# Patient Record
Sex: FEMALE | Race: WHITE | Employment: UNEMPLOYED | URBAN - METROPOLITAN AREA
[De-identification: names, ages, dates, MRNs, and addresses within clinical notes are randomized per-mention and may not be internally consistent; named-entity substitution may affect disease eponyms.]

---

## 2017-04-06 ENCOUNTER — OFFICE VISIT (OUTPATIENT)
Dept: FAMILY MEDICINE CLINIC | Facility: CLINIC | Age: 27
End: 2017-04-06

## 2017-04-06 VITALS
RESPIRATION RATE: 12 BRPM | TEMPERATURE: 99 F | DIASTOLIC BLOOD PRESSURE: 73 MMHG | HEIGHT: 65 IN | BODY MASS INDEX: 20.53 KG/M2 | HEART RATE: 71 BPM | SYSTOLIC BLOOD PRESSURE: 120 MMHG | WEIGHT: 123.19 LBS

## 2017-04-06 DIAGNOSIS — M25.661 DECREASED RANGE OF MOTION OF RIGHT KNEE: ICD-10-CM

## 2017-04-06 DIAGNOSIS — G89.29 CHRONIC PAIN OF RIGHT KNEE: Primary | ICD-10-CM

## 2017-04-06 DIAGNOSIS — R29.898 WEAKNESS OF RIGHT LEG: ICD-10-CM

## 2017-04-06 DIAGNOSIS — M25.561 CHRONIC PAIN OF RIGHT KNEE: Primary | ICD-10-CM

## 2017-04-06 PROCEDURE — 99214 OFFICE O/P EST MOD 30 MIN: CPT | Performed by: FAMILY MEDICINE

## 2017-04-06 PROCEDURE — 99212 OFFICE O/P EST SF 10 MIN: CPT | Performed by: FAMILY MEDICINE

## 2017-04-06 RX ORDER — METHYLPREDNISOLONE 4 MG/1
TABLET ORAL
Qty: 1 KIT | Refills: 0 | Status: SHIPPED | OUTPATIENT
Start: 2017-04-06 | End: 2017-05-11 | Stop reason: ALTCHOICE

## 2017-04-06 NOTE — PROGRESS NOTES
Patient ID: Jerardo Downey is a 27year old female. HPI  Patient presents with:  Pain: right knee    She saw me in December. She states that her knee is really not changed.   She still has pain on the medial aspect of the right knee near the patella bu Strength       Quadriceps 5/5      Hamstrings 5/5       Joint line tenderness       Medial None      Lateral None      Pes anserinus None   Medial aspect of patella  +++       Guilherme's Medial Negative   Guilherme's Lateral Negative        Stability Test

## 2017-04-11 ENCOUNTER — TELEPHONE (OUTPATIENT)
Dept: FAMILY MEDICINE CLINIC | Facility: CLINIC | Age: 27
End: 2017-04-11

## 2017-04-11 DIAGNOSIS — M25.669 DECREASED RANGE OF MOTION (ROM) OF KNEE: ICD-10-CM

## 2017-04-11 DIAGNOSIS — G89.29 CHRONIC PAIN OF RIGHT KNEE: Primary | ICD-10-CM

## 2017-04-11 DIAGNOSIS — M25.561 CHRONIC PAIN OF RIGHT KNEE: Primary | ICD-10-CM

## 2017-04-11 NOTE — TELEPHONE ENCOUNTER
After faxing over notes , The patients insurance is requesting a peer to peer for authorization.     Please call [phone]    Tracking number : [id number]    Test : MRI of R knee    Thank You

## 2017-04-12 NOTE — TELEPHONE ENCOUNTER
Let patient know her insurance refuses to cover the MRI of the knee which I find ridiculous considering that her range of motion and strength is so decreased. I will have her see an orthopedic doctor for evaluation.

## 2017-04-12 NOTE — TELEPHONE ENCOUNTER
Called patient and informed of the denial and referral to see ortho. Information provided to patient. She states she will call and make an appointment.

## 2017-04-12 NOTE — TELEPHONE ENCOUNTER
[facility] THROUGH [facility] HAS DENIED THIS AUTHORIZATION  REQUEST . A PEER TO PEER CAN STILL BE DONE TO APPEAL THIS DECISION.     Please call [phone]   Tracking number : [id number]   Test : MRI of R knee   Thank You

## 2017-04-17 ENCOUNTER — TELEPHONE (OUTPATIENT)
Dept: GASTROENTEROLOGY | Facility: CLINIC | Age: 27
End: 2017-04-17

## 2017-04-17 DIAGNOSIS — B18.1 HEPATITIS B CARRIER (HCC): Primary | ICD-10-CM

## 2017-04-17 NOTE — TELEPHONE ENCOUNTER
Recall liver US one year     Per 5/2016 encounter - due for labs, US liver and office visit.     Please sign off on above and I will notify pt

## 2017-05-02 ENCOUNTER — OFFICE VISIT (OUTPATIENT)
Dept: ORTHOPEDICS CLINIC | Facility: CLINIC | Age: 27
End: 2017-05-02

## 2017-05-02 DIAGNOSIS — M22.41 PATELLA, CHONDROMALACIA, RIGHT: Primary | ICD-10-CM

## 2017-05-02 PROCEDURE — 99212 OFFICE O/P EST SF 10 MIN: CPT | Performed by: ORTHOPAEDIC SURGERY

## 2017-05-02 PROCEDURE — 99243 OFF/OP CNSLTJ NEW/EST LOW 30: CPT | Performed by: ORTHOPAEDIC SURGERY

## 2017-05-02 NOTE — PROGRESS NOTES
5/2/2017  Danielduyen Jose  9/20/1990  27year old   female  Lashaun Escalante MD    HPI:   Patient presents with:  Consult: Right knee pain -- Onset 8 mths and from working out at the gym. Rates pain 2-10 depending on activity. Xrays taken. No MRI done.  Hayley Lemus right lower extremity. The patient's skin is intact and compartments are soft. The patient's lower extremities are warm and pink with brisk capillary refill and 2+ distal pulses.   Sensation is intact to light touch in superficial peroneal, deep peroneal,

## 2017-05-09 ENCOUNTER — OFFICE VISIT (OUTPATIENT)
Dept: PHYSICAL THERAPY | Age: 27
End: 2017-05-09
Attending: ORTHOPAEDIC SURGERY
Payer: MEDICAID

## 2017-05-09 DIAGNOSIS — M22.41 PATELLA, CHONDROMALACIA, RIGHT: Primary | ICD-10-CM

## 2017-05-09 PROCEDURE — 97161 PT EVAL LOW COMPLEX 20 MIN: CPT

## 2017-05-09 PROCEDURE — 97110 THERAPEUTIC EXERCISES: CPT

## 2017-05-09 NOTE — PROGRESS NOTES
PHYSICAL THERAPY EVALUATION:   Referring Physician: Dr. 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 Pt has symmetrical ROM but painful on R knee flexion.  She  has decreased strength on hip flex and rotators but evident decreased on LR knee extension, unable to complete lo complexity) 1, theraex x1      Total Timed Treatment: 10 min     Total Treatment Time: 45 min         PLAN OF CARE:    Goals:    1. Pt will be I with HEP,its progression and management of symptoms  2.  Pt will increase strength on RLE  To half a grade or be

## 2017-05-11 ENCOUNTER — OFFICE VISIT (OUTPATIENT)
Dept: FAMILY MEDICINE CLINIC | Facility: CLINIC | Age: 27
End: 2017-05-11

## 2017-05-11 ENCOUNTER — OFFICE VISIT (OUTPATIENT)
Dept: PHYSICAL THERAPY | Age: 27
End: 2017-05-11
Attending: ORTHOPAEDIC SURGERY
Payer: MEDICAID

## 2017-05-11 VITALS
SYSTOLIC BLOOD PRESSURE: 103 MMHG | HEIGHT: 65 IN | BODY MASS INDEX: 20.6 KG/M2 | WEIGHT: 123.63 LBS | HEART RATE: 80 BPM | DIASTOLIC BLOOD PRESSURE: 64 MMHG

## 2017-05-11 DIAGNOSIS — Z00.00 ROUTINE MEDICAL EXAM: Primary | ICD-10-CM

## 2017-05-11 DIAGNOSIS — B18.1 HEPATITIS B CARRIER (HCC): ICD-10-CM

## 2017-05-11 DIAGNOSIS — M25.561 ACUTE PAIN OF RIGHT KNEE: ICD-10-CM

## 2017-05-11 PROCEDURE — 97110 THERAPEUTIC EXERCISES: CPT

## 2017-05-11 PROCEDURE — 99395 PREV VISIT EST AGE 18-39: CPT | Performed by: FAMILY MEDICINE

## 2017-05-11 RX ORDER — NAPROXEN 500 MG/1
500 TABLET ORAL 2 TIMES DAILY WITH MEALS
Qty: 14 TABLET | Refills: 0 | Status: SHIPPED | OUTPATIENT
Start: 2017-05-11 | End: 2018-01-09

## 2017-05-11 NOTE — PROGRESS NOTES
HPI:   Lay Jewell is a 27year old female who presents for a complete physical exam.    Pt here for regular physical. Exercising regularly and eating  Healthy. Recent pain in right knee but going to physical therapy - just started.  Normal menses monthl auscultation  CARDIO: RRR without murmur  GI: good BS's,no masses, HSM or tenderness  MUSCULOSKELETAL: back is not tender,FROM of the back  EXTREMITIES: no cyanosis, clubbing or edema  NEURO: Oriented times three,cranial nerves are intact,motor and sensory

## 2017-05-11 NOTE — PROGRESS NOTES
Dx: (R) chondromalacia patella                                Next MD visit: none scheduled  Fall Risk: standard         Precautions: n/a           Medications: NO  Subjective: Patient reports that her knee feels the same .  Patient eports pain 2/10 in her

## 2017-05-12 ENCOUNTER — LAB ENCOUNTER (OUTPATIENT)
Dept: LAB | Age: 27
End: 2017-05-12
Attending: FAMILY MEDICINE
Payer: MEDICAID

## 2017-05-12 DIAGNOSIS — B18.1 HEPATITIS B CARRIER (HCC): ICD-10-CM

## 2017-05-12 DIAGNOSIS — Z00.00 ROUTINE MEDICAL EXAM: ICD-10-CM

## 2017-05-12 PROCEDURE — 36415 COLL VENOUS BLD VENIPUNCTURE: CPT

## 2017-05-12 PROCEDURE — 82607 VITAMIN B-12: CPT

## 2017-05-12 PROCEDURE — 82248 BILIRUBIN DIRECT: CPT

## 2017-05-12 PROCEDURE — 82306 VITAMIN D 25 HYDROXY: CPT

## 2017-05-12 PROCEDURE — 85025 COMPLETE CBC W/AUTO DIFF WBC: CPT

## 2017-05-12 PROCEDURE — 80053 COMPREHEN METABOLIC PANEL: CPT

## 2017-05-12 PROCEDURE — 87517 HEPATITIS B DNA QUANT: CPT

## 2017-05-12 PROCEDURE — 80061 LIPID PANEL: CPT

## 2017-05-12 PROCEDURE — 84443 ASSAY THYROID STIM HORMONE: CPT

## 2017-05-15 ENCOUNTER — TELEPHONE (OUTPATIENT)
Dept: GASTROENTEROLOGY | Facility: CLINIC | Age: 27
End: 2017-05-15

## 2017-05-15 NOTE — TELEPHONE ENCOUNTER
----- Message from Haim Randall MD sent at 5/15/2017  7:27 AM CDT -----  Please inform the patient that her liver enzymes are normal.  Hepatitis B DNA is barely detectable and stable as compared to last year. This is favorable.   The patient should

## 2017-05-15 NOTE — TELEPHONE ENCOUNTER
Informed pt test results and Dr. Ramsey Goodrich message to have US done and f/u. Pt verbalized understanding. Transfer to make appt.

## 2017-05-16 ENCOUNTER — OFFICE VISIT (OUTPATIENT)
Dept: PHYSICAL THERAPY | Age: 27
End: 2017-05-16
Attending: ORTHOPAEDIC SURGERY
Payer: MEDICAID

## 2017-05-16 PROCEDURE — 97110 THERAPEUTIC EXERCISES: CPT

## 2017-05-16 NOTE — PROGRESS NOTES
Dx: (R) chondromalacia patella                                Next MD visit: none scheduled  Fall Risk: standard         Precautions: n/a           Medications: NO  Subjective: Patient reports that her knee feels the same .  Patient reports feeling the same half a grade or better on muscles graded below 5/5 for better stability  3. Pt will report overall decreased on R knee pain to 50% or better to be able to progress well in therapy and return to PLOF  4.  Pt will ambulate with symmetrical gait 100% of the eligio

## 2017-05-18 ENCOUNTER — TELEPHONE (OUTPATIENT)
Dept: FAMILY MEDICINE CLINIC | Facility: CLINIC | Age: 27
End: 2017-05-18

## 2017-05-18 ENCOUNTER — HOSPITAL ENCOUNTER (OUTPATIENT)
Dept: ULTRASOUND IMAGING | Age: 27
Discharge: HOME | End: 2017-05-18
Attending: INTERNAL MEDICINE
Payer: MEDICAID

## 2017-05-18 ENCOUNTER — OFFICE VISIT (OUTPATIENT)
Dept: PHYSICAL THERAPY | Age: 27
End: 2017-05-18
Attending: ORTHOPAEDIC SURGERY
Payer: MEDICAID

## 2017-05-18 DIAGNOSIS — B18.1 HEPATITIS B CARRIER (HCC): ICD-10-CM

## 2017-05-18 PROCEDURE — 97110 THERAPEUTIC EXERCISES: CPT

## 2017-05-18 PROCEDURE — 76705 ECHO EXAM OF ABDOMEN: CPT | Performed by: INTERNAL MEDICINE

## 2017-05-18 NOTE — PROGRESS NOTES
Dx: (R) chondromalacia patella                                Next MD visit: none scheduled  Fall Risk: standard         Precautions: n/a           Medications: NO  Subjective: Patient reports that her right knee felt sore for one day after last visit  How up / lateral step up on 4 inch step x 10       (R) SLS on air foam : cone taps x 20        Assessment: initiated NEMS for QS , SLR and step ups . Goals     • Therapy Goals      Goals:    1.  Pt will be I with HEP,its progression and management of symptoms

## 2017-05-22 ENCOUNTER — TELEPHONE (OUTPATIENT)
Dept: GASTROENTEROLOGY | Facility: CLINIC | Age: 27
End: 2017-05-22

## 2017-05-22 NOTE — TELEPHONE ENCOUNTER
Pt contacted and reviewed results. Reviewed no sooner openings in June for routine follow-up.  Advised keeping her appt in July as scheduled

## 2017-05-22 NOTE — TELEPHONE ENCOUNTER
----- Message from Haim Randall MD sent at 5/19/2017  8:02 AM CDT -----  Please inform Mic that her US was normal.  Keep appointment in July.

## 2017-05-23 ENCOUNTER — OFFICE VISIT (OUTPATIENT)
Dept: PHYSICAL THERAPY | Age: 27
End: 2017-05-23
Attending: ORTHOPAEDIC SURGERY
Payer: MEDICAID

## 2017-05-23 PROCEDURE — 97110 THERAPEUTIC EXERCISES: CPT

## 2017-05-23 NOTE — PROGRESS NOTES
Dx: (R) chondromalacia patella                                Next MD visit: none scheduled  Fall Risk: standard         Precautions: n/a           Medications: NO  Subjective: Patient reports that she feels a little better with walking by 10% but she stil 25 Shuttle BLE 6 bands 2x10  Shuttle R?L LE 4 bands 2x10  Shuttle B heel raises 3 bands 2x10    Shuttle : (R) leg press  With 2 B X 20 Standing : heel raises x 20 Shuttle : right knee press with 3 B X 25 Step up 6\" x20: anterior and posterior    Standing

## 2017-05-25 ENCOUNTER — OFFICE VISIT (OUTPATIENT)
Dept: PHYSICAL THERAPY | Age: 27
End: 2017-05-25
Attending: ORTHOPAEDIC SURGERY
Payer: MEDICAID

## 2017-05-25 PROCEDURE — 97110 THERAPEUTIC EXERCISES: CPT

## 2017-05-25 NOTE — PROGRESS NOTES
Dx: (R) chondromalacia patella                                Next MD visit: none scheduled  Fall Risk: standard         Precautions: n/a           Medications: NO  Subjective: Patient reports that she feels the same overall and reports pain with (R) knee 1 lbs x 10 x 2  Shuttle : damian leg press with 6 B  15 x 2  Shuttle : (R) leg press with 4 B  10 x 2  Shuttle : damian heel raises with 6 B 15 x 2     Shuttle : damian leg press  With 4 B  X 20 gastroc stretches on slant board x 5  Shuttle : damian leg press with 5 B

## 2017-05-30 ENCOUNTER — OFFICE VISIT (OUTPATIENT)
Dept: PHYSICAL THERAPY | Age: 27
End: 2017-05-30
Attending: ORTHOPAEDIC SURGERY
Payer: MEDICAID

## 2017-05-30 PROCEDURE — 97014 ELECTRIC STIMULATION THERAPY: CPT

## 2017-05-30 PROCEDURE — 97110 THERAPEUTIC EXERCISES: CPT

## 2017-05-30 NOTE — PROGRESS NOTES
Patient Name: Anu Jolly, : 1990, MRN: [id number]   Date:  2017  Referring Physician:  Cherie Nava    Diagnosis: Patella, chondromalacia, right     Progress Summary    Pt has attended 7 visits in Physical Therapy.      Progress No magali noriega 5/10              Objective:   Observation:    Gait: no gait asymmetry at this time and able to manage stairs with reciprocal pattern but still with minimal compensation with descending  Palpation: tenderness on medial aspect of L knee  Sensation: in TREATMENT THIS DAY:  . Reassessed   Recumbent bike x level 3x 6 mins   LAQ 2x10   Supine TKE's 1# 2x10   SLR 2# 2x10   Shuttle BLE 6 bands 2x15   shuttle R/L 4 bands 2x10   IFC on  Hz x15 mins             Charges : Theraex x2, IFC Treatment tI

## 2017-06-06 ENCOUNTER — OFFICE VISIT (OUTPATIENT)
Dept: ORTHOPEDICS CLINIC | Facility: CLINIC | Age: 27
End: 2017-06-06

## 2017-06-06 DIAGNOSIS — M25.561 ACUTE PAIN OF RIGHT KNEE: Primary | ICD-10-CM

## 2017-06-06 PROCEDURE — 99213 OFFICE O/P EST LOW 20 MIN: CPT | Performed by: ORTHOPAEDIC SURGERY

## 2017-06-06 PROCEDURE — 99212 OFFICE O/P EST SF 10 MIN: CPT | Performed by: ORTHOPAEDIC SURGERY

## 2017-06-06 NOTE — PROGRESS NOTES
6/6/2017  Mic Garcia  9/20/1990  27year old   female  Homero Juan MD    HPI:   Patient presents with:  Knee Pain: right- pt states PT helped minimally.      This is a pleasant 17-year-old female with previous diagnosis of right knee patellofemo

## 2017-06-13 ENCOUNTER — TELEPHONE (OUTPATIENT)
Dept: ORTHOPEDICS CLINIC | Facility: CLINIC | Age: 27
End: 2017-06-13

## 2017-06-13 NOTE — TELEPHONE ENCOUNTER
Called MARIO to initiate PA for MRI right knee w/o con. Gave clinicals per [facility] OV notes. MRI pending for clinicals. ALTHEA#[id number].  Clinicals faxed

## 2017-06-16 ENCOUNTER — TELEPHONE (OUTPATIENT)
Dept: ORTHOPEDICS CLINIC | Facility: CLINIC | Age: 27
End: 2017-06-16

## 2017-06-16 NOTE — TELEPHONE ENCOUNTER
deb'd fax from Parviz Kitchen [employer] w/ MRI approval auth # [id number] exp 7/13/17 at [facility] in [place] [address]. Order faxed to [address] [place] [address] informed of Veronica Chatman. She states her ins already called her and notified her. [facility]  Advised to bring d

## 2017-06-16 NOTE — TELEPHONE ENCOUNTER
Lita from [facility] calling to see if MRI of knee is with contrast or without. States to have RN call patient to confirm with her.  Lita also states will fax over Authorization to [facility] office shortly, and that patient will have MRI done at [place] [address] and

## 2017-06-16 NOTE — TELEPHONE ENCOUNTER
No telephone number left to report order is for no contrast of MRI. Tasking to [facility] for MRI authorization.

## 2017-06-19 NOTE — TELEPHONE ENCOUNTER
Called Juhi and s/w Juhi and she states she no longer needs any further info on this case, as it was approved already.

## 2017-07-07 ENCOUNTER — TELEPHONE (OUTPATIENT)
Dept: ORTHOPEDICS CLINIC | Facility: CLINIC | Age: 27
End: 2017-07-07

## 2017-07-13 ENCOUNTER — TELEPHONE (OUTPATIENT)
Dept: ORTHOPEDICS CLINIC | Facility: CLINIC | Age: 27
End: 2017-07-13

## 2017-07-13 NOTE — TELEPHONE ENCOUNTER
pt called. She states her MRI results should arrive to your office on Monday 7/17/17. Next opening for [facility] is 8/1/17. She is asking to be seen sooner.   Thank you

## 2017-07-13 NOTE — TELEPHONE ENCOUNTER
Call to Mic, No answer . Left detailed message . Dr. Bing Orourke 100% booked for upcoming weeks. Does not open up until August 1st. Suggested she call every so often to see if there is a cancellation for a sooner appointment.

## 2017-07-18 ENCOUNTER — OFFICE VISIT (OUTPATIENT)
Dept: ORTHOPEDICS CLINIC | Facility: CLINIC | Age: 27
End: 2017-07-18

## 2017-07-18 DIAGNOSIS — M25.561 ACUTE PAIN OF RIGHT KNEE: Primary | ICD-10-CM

## 2017-07-18 PROCEDURE — 99212 OFFICE O/P EST SF 10 MIN: CPT | Performed by: ORTHOPAEDIC SURGERY

## 2017-07-18 PROCEDURE — 99213 OFFICE O/P EST LOW 20 MIN: CPT | Performed by: ORTHOPAEDIC SURGERY

## 2017-07-18 NOTE — PROGRESS NOTES
7/18/2017  Mic Garcia  9/20/1990  27year old   female  Mahnaz Harmon MD    HPI:   Patient presents with:  Test Results: MRI right knee -- Disc with pt.      This is a pleasant 25-year-old female that comes in today for MRI results of the right kn tendon repair. The patient will likely have surgery in October 2017. Patient will follow-up prior to she would like to undergo surgical intervention in order to have a preoperative office visit.     The risks, benefits, and alternatives of surgical versus

## 2017-07-19 ENCOUNTER — OFFICE VISIT (OUTPATIENT)
Dept: GASTROENTEROLOGY | Facility: CLINIC | Age: 27
End: 2017-07-19

## 2017-07-19 VITALS
BODY MASS INDEX: 19.44 KG/M2 | SYSTOLIC BLOOD PRESSURE: 98 MMHG | HEIGHT: 66 IN | HEART RATE: 80 BPM | WEIGHT: 121 LBS | DIASTOLIC BLOOD PRESSURE: 62 MMHG

## 2017-07-19 DIAGNOSIS — B18.1 HEPATITIS B CARRIER (HCC): Primary | ICD-10-CM

## 2017-07-19 PROCEDURE — 99212 OFFICE O/P EST SF 10 MIN: CPT | Performed by: INTERNAL MEDICINE

## 2017-07-19 PROCEDURE — 99213 OFFICE O/P EST LOW 20 MIN: CPT | Performed by: INTERNAL MEDICINE

## 2017-07-20 ENCOUNTER — TELEPHONE (OUTPATIENT)
Dept: ORTHOPEDICS CLINIC | Facility: CLINIC | Age: 27
End: 2017-07-20

## 2017-07-20 NOTE — TELEPHONE ENCOUNTER
Patient would like to schedule knee surgery. States will be leaving the country in September and is trying to get this done asap. Please advise.  Thank you

## 2017-07-30 NOTE — PROGRESS NOTES
HPI:    Patient ID: Deanna Ludwig is a 27year old female. HPI  Rina Silver returns in follow-up. She was last seen in May 2016.   As per previous notes during routine prenatal testing she was found to have chronic E antigen negative, E antibody positive, hep Abdominal: Soft. Bowel sounds are normal. She exhibits no distension and no mass. There is no hepatosplenomegaly. There is no tenderness. There is no rebound and no guarding. Musculoskeletal: She exhibits no edema.    Lymphadenopathy:     She has no cer 2. 9 mm.    OTHER:                       Visualized portions of gallbladder, pancreas and right kidney appeared normal.       Dictated by (CST): Ольга Almaraz MD on 5/18/2017 at 20:26       Approved by (CST): Ольга Almaraz MD on 5/18/2017 at 20:28

## 2017-07-30 NOTE — PATIENT INSTRUCTIONS
1.  Follow-up ultrasound and blood work in 1 year. 2.  Follow-up office visit in 1 year. 3.  Please contact me sooner with any changes.

## 2018-01-04 ENCOUNTER — OFFICE VISIT (OUTPATIENT)
Dept: OBGYN CLINIC | Facility: CLINIC | Age: 28
End: 2018-01-04

## 2018-01-04 VITALS
BODY MASS INDEX: 20 KG/M2 | SYSTOLIC BLOOD PRESSURE: 114 MMHG | DIASTOLIC BLOOD PRESSURE: 70 MMHG | HEART RATE: 92 BPM | WEIGHT: 121 LBS

## 2018-01-04 DIAGNOSIS — N92.6 MISSED MENSES: ICD-10-CM

## 2018-01-04 DIAGNOSIS — Z32.01 PREGNANCY EXAMINATION OR TEST, POSITIVE RESULT: Primary | ICD-10-CM

## 2018-01-04 LAB
CONTROL LINE PRESENT WITH A CLEAR BACKGROUND (YES/NO): YES YES/NO
KIT LOT #: NORMAL NUMERIC
PREGNANCY TEST, URINE: POSITIVE

## 2018-01-04 PROCEDURE — 76817 TRANSVAGINAL US OBSTETRIC: CPT | Performed by: OBSTETRICS & GYNECOLOGY

## 2018-01-04 PROCEDURE — 81025 URINE PREGNANCY TEST: CPT | Performed by: OBSTETRICS & GYNECOLOGY

## 2018-01-04 PROCEDURE — 99203 OFFICE O/P NEW LOW 30 MIN: CPT | Performed by: OBSTETRICS & GYNECOLOGY

## 2018-01-04 RX ORDER — MULTIVITAMIN
TABLET ORAL
COMMUNITY
End: 2018-01-09

## 2018-01-04 NOTE — PROGRESS NOTES
[facility]  Obstetrics and Gynecology  Pregnancy Confirmation  Neville Martinez MD    HPI     [employer] is a 32year old  with LMP around 10/29/17 who presents for pregnancy confirmation.      Patient had a positive pregnancy test on [facility] Comment: rarely     Drug use: No    Sexual activity: Not on file     Other Topics Concern    Caffeine Concern Yes    Comment: coffee, 2 cups/day     Social History Narrative   None on file     Exam   /70   Pulse 92   Wt 121 lb (54.9 kg)   LMP  (LMP U

## 2018-01-09 ENCOUNTER — OFFICE VISIT (OUTPATIENT)
Dept: FAMILY MEDICINE CLINIC | Facility: CLINIC | Age: 28
End: 2018-01-09

## 2018-01-09 ENCOUNTER — TELEPHONE (OUTPATIENT)
Dept: OBGYN CLINIC | Facility: CLINIC | Age: 28
End: 2018-01-09

## 2018-01-09 VITALS
SYSTOLIC BLOOD PRESSURE: 97 MMHG | DIASTOLIC BLOOD PRESSURE: 66 MMHG | WEIGHT: 121 LBS | HEART RATE: 89 BPM | BODY MASS INDEX: 20.16 KG/M2 | HEIGHT: 65 IN

## 2018-01-09 DIAGNOSIS — G89.29 CHRONIC PAIN OF RIGHT KNEE: Primary | ICD-10-CM

## 2018-01-09 DIAGNOSIS — M25.561 CHRONIC PAIN OF RIGHT KNEE: Primary | ICD-10-CM

## 2018-01-09 DIAGNOSIS — Z34.91 PREGNANT AND NOT YET DELIVERED IN FIRST TRIMESTER: ICD-10-CM

## 2018-01-09 PROCEDURE — 99212 OFFICE O/P EST SF 10 MIN: CPT | Performed by: FAMILY MEDICINE

## 2018-01-09 PROCEDURE — 99213 OFFICE O/P EST LOW 20 MIN: CPT | Performed by: FAMILY MEDICINE

## 2018-01-09 NOTE — PROGRESS NOTES
HPI:    Patient ID: Ronald Bosworth is a 32year old female. HPI     Patient here to get referral to see Radha Rice.   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

## 2018-01-18 ENCOUNTER — TELEPHONE (OUTPATIENT)
Dept: OBGYN CLINIC | Facility: CLINIC | Age: 28
End: 2018-01-18

## 2018-01-18 NOTE — TELEPHONE ENCOUNTER
ALMA DIEHL FROM DR RIZWAN DIAZ [facility] OFFICE / REQUESTING SOME FORM THAT WAS FAXED OVER ON 01/09/18 / PLS RE-FAX -[phone] AFTER COMPLETED / ATTN: Lemuel Dubin / KAYLA ADV

## 2018-01-19 NOTE — TELEPHONE ENCOUNTER
Number to ado clinic, pt does not have blood orders because pt has not come in for her nurse education visit yet

## 2018-01-22 NOTE — TELEPHONE ENCOUNTER
Pt informed she needed to make a nurse ob education where new ob tests are ordered. Pt verbalized understanding. Apt made.

## 2018-01-23 ENCOUNTER — NURSE ONLY (OUTPATIENT)
Dept: OBGYN CLINIC | Facility: CLINIC | Age: 28
End: 2018-01-23

## 2018-01-23 ENCOUNTER — TELEPHONE (OUTPATIENT)
Dept: PEDIATRICS CLINIC | Facility: CLINIC | Age: 28
End: 2018-01-23

## 2018-01-23 ENCOUNTER — LAB ENCOUNTER (OUTPATIENT)
Dept: LAB | Facility: HOSPITAL | Age: 28
End: 2018-01-23
Attending: OBSTETRICS & GYNECOLOGY
Payer: MEDICAID

## 2018-01-23 DIAGNOSIS — Z34.81 ENCOUNTER FOR SUPERVISION OF OTHER NORMAL PREGNANCY IN FIRST TRIMESTER: Primary | ICD-10-CM

## 2018-01-23 DIAGNOSIS — Z34.81 ENCOUNTER FOR SUPERVISION OF OTHER NORMAL PREGNANCY IN FIRST TRIMESTER: ICD-10-CM

## 2018-01-23 PROBLEM — S76.119A RUPTURE, TENDON, QUADRICEPS: Status: ACTIVE | Noted: 2018-01-23

## 2018-01-23 LAB
ANTIBODY SCREEN: NEGATIVE
BASOPHILS # BLD: 0 K/UL (ref 0–0.2)
BASOPHILS NFR BLD: 0 %
BILIRUB UR QL: NEGATIVE
COLOR UR: YELLOW
EOSINOPHIL # BLD: 0.1 K/UL (ref 0–0.7)
EOSINOPHIL NFR BLD: 1 %
ERYTHROCYTE [DISTWIDTH] IN BLOOD BY AUTOMATED COUNT: 13.3 % (ref 11–15)
GLUCOSE UR-MCNC: NEGATIVE MG/DL
HCT VFR BLD AUTO: 36.4 % (ref 35–48)
HGB BLD-MCNC: 12.4 G/DL (ref 12–16)
HGB UR QL STRIP.AUTO: NEGATIVE
LYMPHOCYTES # BLD: 2.4 K/UL (ref 1–4)
LYMPHOCYTES NFR BLD: 23 %
MCH RBC QN AUTO: 29.1 PG (ref 27–32)
MCHC RBC AUTO-ENTMCNC: 34.2 G/DL (ref 32–37)
MCV RBC AUTO: 85 FL (ref 80–100)
MONOCYTES # BLD: 0.7 K/UL (ref 0–1)
MONOCYTES NFR BLD: 7 %
NEUTROPHILS # BLD AUTO: 7.4 K/UL (ref 1.8–7.7)
NEUTROPHILS NFR BLD: 70 %
NITRITE UR QL STRIP.AUTO: NEGATIVE
PH UR: 5 [PH] (ref 5–8)
PLATELET # BLD AUTO: 250 K/UL (ref 140–400)
PMV BLD AUTO: 8.8 FL (ref 7.4–10.3)
PROT UR-MCNC: NEGATIVE MG/DL
RBC # BLD AUTO: 4.28 M/UL (ref 3.7–5.4)
RBC #/AREA URNS AUTO: 2 /HPF
RH BLOOD TYPE: POSITIVE
RUBV IGG SER-ACNC: <10 IU/ML
SP GR UR STRIP: 1.02 (ref 1–1.03)
UROBILINOGEN UR STRIP-ACNC: <2
VIT C UR-MCNC: 40 MG/DL
WBC # BLD AUTO: 10.6 K/UL (ref 4–11)
WBC #/AREA URNS AUTO: 1 /HPF

## 2018-01-23 PROCEDURE — 86900 BLOOD TYPING SEROLOGIC ABO: CPT

## 2018-01-23 PROCEDURE — 87340 HEPATITIS B SURFACE AG IA: CPT

## 2018-01-23 PROCEDURE — 81001 URINALYSIS AUTO W/SCOPE: CPT

## 2018-01-23 PROCEDURE — 36415 COLL VENOUS BLD VENIPUNCTURE: CPT

## 2018-01-23 PROCEDURE — 87086 URINE CULTURE/COLONY COUNT: CPT

## 2018-01-23 PROCEDURE — 99211 OFF/OP EST MAY X REQ PHY/QHP: CPT | Performed by: OBSTETRICS & GYNECOLOGY

## 2018-01-23 PROCEDURE — 87389 HIV-1 AG W/HIV-1&-2 AB AG IA: CPT

## 2018-01-23 PROCEDURE — 85025 COMPLETE CBC W/AUTO DIFF WBC: CPT

## 2018-01-23 PROCEDURE — 86850 RBC ANTIBODY SCREEN: CPT

## 2018-01-23 PROCEDURE — 86901 BLOOD TYPING SEROLOGIC RH(D): CPT

## 2018-01-23 PROCEDURE — 87341 HEP B SURFACE AG NEUTRLZJ IA: CPT

## 2018-01-23 PROCEDURE — 86762 RUBELLA ANTIBODY: CPT

## 2018-01-23 PROCEDURE — 86780 TREPONEMA PALLIDUM: CPT

## 2018-01-23 NOTE — PROGRESS NOTES
Pt here today for OB RN Education visit. Pt currently 12w2d based on LMP. . Hx of PROM at 35 weeks in previous pregnancy. Pt Hep B positive. Seeing Dr. Anton Chang. Pt reported she traveled to [facility] in September and [place] in November.  Didn't reca

## 2018-01-24 LAB
HBV SURFACE AG SERPL QL IA: REACTIVE
HBV SURFACE AG SERPL QL NT: REACTIVE
HIV1+2 AB SERPL QL IA: NONREACTIVE

## 2018-01-26 ENCOUNTER — OFFICE VISIT (OUTPATIENT)
Dept: ORTHOPEDICS CLINIC | Facility: CLINIC | Age: 28
End: 2018-01-26

## 2018-01-26 DIAGNOSIS — M25.561 ACUTE PAIN OF RIGHT KNEE: Primary | ICD-10-CM

## 2018-01-26 LAB — T PALLIDUM AB SER QL: NEGATIVE

## 2018-01-26 PROCEDURE — 99212 OFFICE O/P EST SF 10 MIN: CPT | Performed by: ORTHOPAEDIC SURGERY

## 2018-01-26 PROCEDURE — 99213 OFFICE O/P EST LOW 20 MIN: CPT | Performed by: ORTHOPAEDIC SURGERY

## 2018-01-26 NOTE — PROGRESS NOTES
1/26/2018  Mic Garcia  9/20/1990  32year old   female  Alin Kelsey MD    HPI:   Patient presents with:  Knee Pain: right-  pt states she could't follow-up in oct due to needing to change insurance.  pt states she wants to do sx, but she is 12 w pregnant. Patient was frustrated with this and states that she likely will not be able to have surgery for 1–2 years. Patient was told that there is risk to the fetus when undergoing surgical intervention.   The patient understands and follow-up in the fu

## 2018-01-30 ENCOUNTER — TELEPHONE (OUTPATIENT)
Dept: OBGYN CLINIC | Facility: CLINIC | Age: 28
End: 2018-01-30

## 2018-01-30 ENCOUNTER — TELEPHONE (OUTPATIENT)
Dept: PEDIATRICS CLINIC | Facility: CLINIC | Age: 28
End: 2018-01-30

## 2018-01-30 NOTE — TELEPHONE ENCOUNTER
Greta from the [facility] called and given pt's [facility] and address.

## 2018-01-30 NOTE — TELEPHONE ENCOUNTER
Pt was suppose to come in on 2/1 for New OB? But scheduled as nurse ed? Is that right? Also, needs to r/s b/c Ken Sleeper will not be in. Pt will be back on 2/13 from vacation. Is it okay for pt schedule 2 wks past original appt date?

## 2018-01-30 NOTE — TELEPHONE ENCOUNTER
Pls note:   Pt requested to be seen by another provider for her New OB visit as she will be leaving for vacation and will not be back until 2/13. Apt she had originally with LASHELL was cancelled due to no office hours that day.  Pt scheduled with SERGIO on 1/31/18

## 2018-01-31 ENCOUNTER — INITIAL PRENATAL (OUTPATIENT)
Dept: OBGYN CLINIC | Facility: CLINIC | Age: 28
End: 2018-01-31

## 2018-01-31 VITALS — SYSTOLIC BLOOD PRESSURE: 99 MMHG | BODY MASS INDEX: 20 KG/M2 | DIASTOLIC BLOOD PRESSURE: 68 MMHG | WEIGHT: 123 LBS

## 2018-01-31 DIAGNOSIS — N89.8 VAGINAL DISCHARGE IN PREGNANCY, FIRST TRIMESTER: ICD-10-CM

## 2018-01-31 DIAGNOSIS — O26.891 VAGINAL DISCHARGE IN PREGNANCY, FIRST TRIMESTER: ICD-10-CM

## 2018-01-31 DIAGNOSIS — Z34.91 NORMAL PREGNANCY IN FIRST TRIMESTER: Primary | ICD-10-CM

## 2018-01-31 PROBLEM — Z78.9 NOT IMMUNE TO RUBELLA: Status: ACTIVE | Noted: 2018-01-31

## 2018-01-31 PROBLEM — O09.293 HISTORY OF PRETERM PREMATURE RUPTURE OF MEMBRANES (PROM) IN PREVIOUS PREGNANCY, CURRENTLY PREGNANT IN THIRD TRIMESTER: Status: ACTIVE | Noted: 2018-01-31

## 2018-01-31 LAB
APPEARANCE: CLEAR
MULTISTIX LOT#: NORMAL NUMERIC
PH, URINE: 6 (ref 4.5–8)
SPECIFIC GRAVITY: 1.02 (ref 1–1.03)
URINE-COLOR: YELLOW
UROBILINOGEN,SEMI-QN: 0.2 MG/DL (ref 0–1.9)

## 2018-01-31 PROCEDURE — 81002 URINALYSIS NONAUTO W/O SCOPE: CPT | Performed by: OBSTETRICS & GYNECOLOGY

## 2018-01-31 PROCEDURE — 0500F INITIAL PRENATAL CARE VISIT: CPT | Performed by: OBSTETRICS & GYNECOLOGY

## 2018-01-31 NOTE — PROGRESS NOTES
No c/o but for pubic pressure. No uti sx. MFM consultation for hx PTL and D, PROM. Candidate for Nathalie  History of hepatitis B carrier state. GI . Will inform him to advise on testing.   Last seen in July with normal hepatic function te

## 2018-02-01 ENCOUNTER — TELEPHONE (OUTPATIENT)
Dept: OBGYN CLINIC | Facility: CLINIC | Age: 28
End: 2018-02-01

## 2018-02-01 DIAGNOSIS — B19.10 HEPATITIS B AFFECTING PREGNANCY: Primary | ICD-10-CM

## 2018-02-01 DIAGNOSIS — O98.419 HEPATITIS B AFFECTING PREGNANCY: Primary | ICD-10-CM

## 2018-02-01 PROBLEM — B18.1 HEPATITIS B CARRIER (HCC): Status: ACTIVE | Noted: 2018-02-01

## 2018-02-01 LAB
C TRACH DNA SPEC QL NAA+PROBE: NEGATIVE
N GONORRHOEA DNA SPEC QL NAA+PROBE: NEGATIVE

## 2018-02-01 NOTE — TELEPHONE ENCOUNTER
Please inform patient that I spoke with Dr. Alfredo Mahan who recommends:  \"I would check a hepatic function panel and hepatitis B DNA every 3 months including early in the third trimester.    She should definitively be seen if her transaminases are elevate

## 2018-02-02 ENCOUNTER — APPOINTMENT (OUTPATIENT)
Dept: LAB | Age: 28
End: 2018-02-02
Attending: OBSTETRICS & GYNECOLOGY
Payer: MEDICAID

## 2018-02-02 DIAGNOSIS — O98.419 HEPATITIS B AFFECTING PREGNANCY: ICD-10-CM

## 2018-02-02 DIAGNOSIS — B19.10 HEPATITIS B AFFECTING PREGNANCY: ICD-10-CM

## 2018-02-02 LAB
ALBUMIN SERPL BCP-MCNC: 3.6 G/DL (ref 3.5–4.8)
ALP SERPL-CCNC: 33 U/L (ref 32–100)
ALT SERPL-CCNC: 22 U/L (ref 14–54)
AST SERPL-CCNC: 23 U/L (ref 15–41)
BILIRUB DIRECT SERPL-MCNC: 0 MG/DL (ref 0–0.2)
BILIRUB SERPL-MCNC: 0.5 MG/DL (ref 0.3–1.2)
GENITAL VAGINOSIS SCREEN: NEGATIVE
LAST PAP RESULT: NORMAL
PAP HISTORY (OTHER THAN LAST PAP): NORMAL
PROT SERPL-MCNC: 7 G/DL (ref 5.9–8.4)
TRICHOMONAS SCREEN: NEGATIVE

## 2018-02-02 PROCEDURE — 80076 HEPATIC FUNCTION PANEL: CPT

## 2018-02-02 PROCEDURE — 36415 COLL VENOUS BLD VENIPUNCTURE: CPT

## 2018-02-02 PROCEDURE — 87517 HEPATITIS B DNA QUANT: CPT

## 2018-02-02 NOTE — TELEPHONE ENCOUNTER
02/02/17 Spoke to pt, Pt will be going to the lab today to get her Blood work done. Pt verbalized understanding, pt did not have any further questions.

## 2018-02-05 ENCOUNTER — TELEPHONE (OUTPATIENT)
Dept: OBGYN CLINIC | Facility: CLINIC | Age: 28
End: 2018-02-05

## 2018-02-05 NOTE — TELEPHONE ENCOUNTER
Please inform patient of the following test results:  Vaginal culture showed a moderately heavy growth of yeast.  She should use vaginal yeast cream like over-the-counter like GYN Lotrimin 7 day or Monistat 7 day vaginal cream.  Her hepatic function panel

## 2018-02-06 LAB
HBV DNA (IU/ML): 300 IU/ML
HBV DNA (LOG IU/ML): 2.5 LOG IU
HBV DNA INTERPRETATION: DETECTED

## 2018-02-14 ENCOUNTER — HOSPITAL ENCOUNTER (OUTPATIENT)
Dept: PERINATAL CARE | Facility: HOSPITAL | Age: 28
Discharge: HOME | End: 2018-02-14
Attending: OBSTETRICS & GYNECOLOGY
Payer: MEDICAID

## 2018-02-14 ENCOUNTER — TELEPHONE (OUTPATIENT)
Dept: OBGYN CLINIC | Facility: CLINIC | Age: 28
End: 2018-02-14

## 2018-02-14 DIAGNOSIS — B18.1 HEPATITIS B CARRIER (HCC): ICD-10-CM

## 2018-02-14 DIAGNOSIS — O09.293 HISTORY OF PRETERM PREMATURE RUPTURE OF MEMBRANES (PROM) IN PREVIOUS PREGNANCY, CURRENTLY PREGNANT IN THIRD TRIMESTER: ICD-10-CM

## 2018-02-14 PROCEDURE — 99243 OFF/OP CNSLTJ NEW/EST LOW 30: CPT | Performed by: OBSTETRICS & GYNECOLOGY

## 2018-02-14 RX ORDER — HYDROXYPROGESTERONE CAPROATE 250 MG/ML
250 INJECTION INTRAMUSCULAR WEEKLY
Qty: 4 ML | Refills: 6 | Status: ON HOLD | OUTPATIENT
Start: 2018-02-14 | End: 2018-07-28

## 2018-02-14 NOTE — TELEPHONE ENCOUNTER
Patient with prior  birth. MFM Dr. Zac Pompa concurs candidate for [facility]. Please arrange for patient to begin weekly [place] shots starting on Monday and through 36 weeks.

## 2018-02-14 NOTE — PROGRESS NOTES
Pierre Jaimes is a 32year old female. Reason for Consult:   H/o  delivery at 35 wks due to PROM. Had pain throughout the pregnancy. Got worse about 2 days prior to PROM. Was different from the contractions. No other prenatal complications. deliveries in the [place], but accounts for over 80 percent of all  morbidity and mortality.  Approximately 20 percent of  deliveries are iatrogenic and are performed for maternal or fetal indications, such as intrauterine growth restr the risk of PTD. Maternal substance abuse increases the risk of  birth.  Cocaine is the most common illicit substance associated with  labor in the [place], and has been detected in approximately 60 percent of women in The long-term safety remains unknown in women and infants exposed to the treatment.         HEP B:  Recommendations: 1. weekly NST at 36wks 2. growth US in the 3rd trimester 3. liver function testing q 2 months 4. notify nursery of status at delivery 5. sex acute fatty liver of pregnancy, HELLP, and intrahepatic cholestasis of pregnancy. Acute HBV infection during pregnancy is usually not severe and is not associated with increased mortality or teratogenicity.  There have been reports of an increased incidence IMPRESSION:   1.  IUP @ 15w3d  2. H/o PROM  3. Hepatitis B carrier  4. Abdominal pain- possibly from abdominal adhesions. RECOMMENDATIONS:   1.  She is a candidate for 17 progesterone  2. detailed US at 20wks / growth US in the 3rd trimester

## 2018-02-21 NOTE — TELEPHONE ENCOUNTER
Per pt she decided she didn't want to have Nathalie injections.   Routing to physicians as Ondina Jean

## 2018-03-06 ENCOUNTER — ROUTINE PRENATAL (OUTPATIENT)
Dept: OBGYN CLINIC | Facility: CLINIC | Age: 28
End: 2018-03-06

## 2018-03-06 ENCOUNTER — TELEPHONE (OUTPATIENT)
Dept: OBGYN CLINIC | Facility: CLINIC | Age: 28
End: 2018-03-06

## 2018-03-06 VITALS
HEART RATE: 83 BPM | BODY MASS INDEX: 21 KG/M2 | WEIGHT: 124 LBS | SYSTOLIC BLOOD PRESSURE: 110 MMHG | DIASTOLIC BLOOD PRESSURE: 68 MMHG

## 2018-03-06 DIAGNOSIS — Z34.02 ENCOUNTER FOR SUPERVISION OF NORMAL FIRST PREGNANCY IN SECOND TRIMESTER: Primary | ICD-10-CM

## 2018-03-06 DIAGNOSIS — O09.292 HISTORY OF PREMATURE RUPTURE OF MEMBRANES IN PREVIOUS PREGNANCY, CURRENTLY PREGNANT IN SECOND TRIMESTER: ICD-10-CM

## 2018-03-06 LAB
MULTISTIX LOT#: NORMAL NUMERIC
PH, URINE: 6.5 (ref 4.5–8)
SPECIFIC GRAVITY: 1 (ref 1–1.03)
URINE-COLOR: YELLOW
UROBILINOGEN,SEMI-QN: 0 MG/DL (ref 0–1.9)

## 2018-03-06 PROCEDURE — 81002 URINALYSIS NONAUTO W/O SCOPE: CPT | Performed by: OBSTETRICS & GYNECOLOGY

## 2018-03-06 PROCEDURE — 0502F SUBSEQUENT PRENATAL CARE: CPT | Performed by: OBSTETRICS & GYNECOLOGY

## 2018-03-06 NOTE — TELEPHONE ENCOUNTER
03/06/2018 Pr Dr. Chet Galvan pt needs a 20 wk ultrasound level 2. Level 2 ultrasound CPT [facility]#[id number]  DX: O09.292  HX premature rupture of membranes previously and currently pregnant, second trimester.      Please Advs. Daz

## 2018-03-06 NOTE — PROGRESS NOTES
[facility]  Obstetrics and Gynecology  Prenatal Visit  Polo Finn MD    JANEE Smiley is a 32year old.o.  18w2d weeks. Here for routine prenatal visit and is without complaints.   Patient denies any regular uterine contractions, s patients with active liver disease. Discussed this is a class risk for hormonal medications and that it is unlikely to have significant impact on her hepatitis B. Patient understands but has declined.   Patient follow-up for routine prenatal visit in 4 we

## 2018-03-07 NOTE — TELEPHONE ENCOUNTER
Case Number: [id number]    Clinicals to be called to [address] [facility]. I submitted only the last PN from [facility].

## 2018-03-07 NOTE — TELEPHONE ENCOUNTER
[id number] [facility] approved CPT [id number] but not [id number]     CPT [id number]   Approved for 3/7/18-8/26/18   [id number]

## 2018-03-22 ENCOUNTER — HOSPITAL ENCOUNTER (OUTPATIENT)
Dept: PERINATAL CARE | Facility: HOSPITAL | Age: 28
Discharge: HOME | End: 2018-03-22
Attending: OBSTETRICS & GYNECOLOGY
Payer: MEDICAID

## 2018-03-22 ENCOUNTER — TELEPHONE (OUTPATIENT)
Dept: OBGYN CLINIC | Facility: CLINIC | Age: 28
End: 2018-03-22

## 2018-03-22 VITALS
BODY MASS INDEX: 20.66 KG/M2 | WEIGHT: 124 LBS | HEART RATE: 111 BPM | HEIGHT: 65 IN | SYSTOLIC BLOOD PRESSURE: 119 MMHG | DIASTOLIC BLOOD PRESSURE: 75 MMHG

## 2018-03-22 DIAGNOSIS — B18.1 HEPATITIS B CARRIER (HCC): ICD-10-CM

## 2018-03-22 DIAGNOSIS — O09.892 HISTORY OF PRETERM DELIVERY, CURRENTLY PREGNANT IN SECOND TRIMESTER: ICD-10-CM

## 2018-03-22 DIAGNOSIS — O09.299 PREGNANCY WITH POOR OBSTETRIC HISTORY: ICD-10-CM

## 2018-03-22 DIAGNOSIS — Z36.3 ENCOUNTER FOR ANTENATAL SCREENING FOR MALFORMATION: ICD-10-CM

## 2018-03-22 DIAGNOSIS — O09.299 PREGNANCY WITH POOR OBSTETRIC HISTORY: Primary | ICD-10-CM

## 2018-03-22 DIAGNOSIS — O09.292 HISTORY OF PREMATURE RUPTURE OF MEMBRANES IN PREVIOUS PREGNANCY, CURRENTLY PREGNANT IN SECOND TRIMESTER: ICD-10-CM

## 2018-03-22 PROCEDURE — 76805 OB US >/= 14 WKS SNGL FETUS: CPT | Performed by: OBSTETRICS & GYNECOLOGY

## 2018-03-22 PROCEDURE — 99215 OFFICE O/P EST HI 40 MIN: CPT | Performed by: OBSTETRICS & GYNECOLOGY

## 2018-03-22 PROCEDURE — 76817 TRANSVAGINAL US OBSTETRIC: CPT | Performed by: OBSTETRICS & GYNECOLOGY

## 2018-03-22 NOTE — PROGRESS NOTES
Logan Fabry  Dear Dr. Evan Nicholas,     Thank you for requesting ultrasound evaluation and maternal fetal medicine consultation on your patient Piotr Lopez.   As you are aware she is a 32year old female  with a Single pregnancies by as much as 33%. It is currently recommended to patients with history of  delivery. The long-term safety remains unknown in women and infants exposed to the treatment.     DIAGNOSIS OF HEPATITIS B  Background  Generally the main goal is reduce vertical transmission to the fetus. CHRONIC HEPATITIS B IN PREGNANCY  Pregnancy is generally well tolerated by women with chronic hepatitis B who do not have advanced liver disease.  Occasionally, women can have a flare during pregnancy and monito appears safe in pregnancy and the risk of drug resistance is low. Patient should be monitored for a flare if antiviral therapy is discontinued after delivery.  This should be directed by a GI physician or hepatologist.       IMPRESSION:  · IUP at 20w4d  · N

## 2018-03-22 NOTE — ADDENDUM NOTE
Encounter addended by: Abraham Johnson on: 3/22/2018  2:03 PM<BR>    Actions taken: Charge Capture section accepted

## 2018-03-22 NOTE — TELEPHONE ENCOUNTER
[address] has been delivered to our office from out [facility] clinic.

## 2018-03-22 NOTE — TELEPHONE ENCOUNTER
See Dr. Kim Rice note and recommendations:  MD Lorenzo Mcarthur MD             Repeat HBV viral load at ~ 32 weeks   Third trimester ultrasound and assessment of placenta location   She would like to begin the [facility], I advised her to call

## 2018-03-22 NOTE — PROGRESS NOTES
Pt here for Level II ultrasound  Hx of PTL,PTD,PPROM  Hep B Carrier  Denies pregnancy complaints and states feeling fetal movement

## 2018-03-27 ENCOUNTER — HOSPITAL ENCOUNTER (OUTPATIENT)
Dept: PERINATAL CARE | Facility: HOSPITAL | Age: 28
Discharge: HOME | End: 2018-03-27
Attending: OBSTETRICS & GYNECOLOGY
Payer: MEDICAID

## 2018-03-27 ENCOUNTER — NURSE ONLY (OUTPATIENT)
Dept: OBGYN CLINIC | Facility: CLINIC | Age: 28
End: 2018-03-27

## 2018-03-27 ENCOUNTER — TELEPHONE (OUTPATIENT)
Dept: OBGYN CLINIC | Facility: CLINIC | Age: 28
End: 2018-03-27

## 2018-03-27 VITALS — SYSTOLIC BLOOD PRESSURE: 99 MMHG | HEART RATE: 88 BPM | DIASTOLIC BLOOD PRESSURE: 64 MMHG

## 2018-03-27 VITALS — DIASTOLIC BLOOD PRESSURE: 46 MMHG | SYSTOLIC BLOOD PRESSURE: 99 MMHG

## 2018-03-27 DIAGNOSIS — O26.879 SHORT CERVICAL LENGTH DURING PREGNANCY: ICD-10-CM

## 2018-03-27 DIAGNOSIS — O26.899 PELVIC PRESSURE IN PREGNANCY: ICD-10-CM

## 2018-03-27 DIAGNOSIS — Z87.51 HISTORY OF PRETERM DELIVERY: Primary | ICD-10-CM

## 2018-03-27 DIAGNOSIS — O09.293 HISTORY OF PRETERM PREMATURE RUPTURE OF MEMBRANES (PROM) IN PREVIOUS PREGNANCY, CURRENTLY PREGNANT IN THIRD TRIMESTER: Primary | ICD-10-CM

## 2018-03-27 DIAGNOSIS — O09.293 HISTORY OF PRETERM PREMATURE RUPTURE OF MEMBRANES (PROM) IN PREVIOUS PREGNANCY, CURRENTLY PREGNANT IN THIRD TRIMESTER: ICD-10-CM

## 2018-03-27 DIAGNOSIS — B18.1 VIRAL HEPATITIS B CHRONIC (HCC): ICD-10-CM

## 2018-03-27 DIAGNOSIS — R10.2 PELVIC PRESSURE IN PREGNANCY: ICD-10-CM

## 2018-03-27 DIAGNOSIS — O26.872 SHORT CERVICAL LENGTH DURING PREGNANCY IN SECOND TRIMESTER: ICD-10-CM

## 2018-03-27 PROCEDURE — 96372 THER/PROPH/DIAG INJ SC/IM: CPT | Performed by: OBSTETRICS & GYNECOLOGY

## 2018-03-27 PROCEDURE — 76817 TRANSVAGINAL US OBSTETRIC: CPT | Performed by: OBSTETRICS & GYNECOLOGY

## 2018-03-27 PROCEDURE — 99212 OFFICE O/P EST SF 10 MIN: CPT | Performed by: OBSTETRICS & GYNECOLOGY

## 2018-03-27 PROCEDURE — 76815 OB US LIMITED FETUS(S): CPT | Performed by: OBSTETRICS & GYNECOLOGY

## 2018-03-27 RX ORDER — HYDROXYPROGESTERONE CAPROATE 250 MG/ML
250 INJECTION INTRAMUSCULAR WEEKLY
Status: DISCONTINUED | OUTPATIENT
Start: 2018-03-27 | End: 2018-07-28

## 2018-03-27 RX ADMIN — HYDROXYPROGESTERONE CAPROATE 250 MG: 250 INJECTION INTRAMUSCULAR at 11:39:00

## 2018-03-27 NOTE — TELEPHONE ENCOUNTER
Referral   Referral # [id number]   Status History     Date Change User   03/27/2018 From Open to Authorized Félix Boothe

## 2018-03-27 NOTE — TELEPHONE ENCOUNTER
Patient c/o lower pelvic pressure today at office visit for [facility] injection. No vag bleeding or vag fluid/leakage. No abnormal vag discharge. Dr. Ramesh Beal informed in office and stated pt to be seen asap at [facility] for cervical length.  [facility] called and sche

## 2018-03-27 NOTE — ADDENDUM NOTE
Encounter addended by: Davon Lopez on: 3/27/2018  4:01 PM<BR>    Actions taken: Visit diagnoses modified, Diagnosis association updated, Charge Capture section accepted

## 2018-03-28 NOTE — TELEPHONE ENCOUNTER
Please call in clinicals to mary.  No later than Friday for CPT: 44624 Case Number: [id number]    Patel Godwin              Patient was here to check cervix [id number]     Plus [id number] was added ( limited to evaluate pelvic pain dx  R10.2, 7.94706; o26.872

## 2018-03-28 NOTE — TELEPHONE ENCOUNTER
Case #[id number]  -[id number]  Patient had this test done yesterday 3/27, per [employer] mistake its valid for date of service starting 3/28/18, [facility] and its pending to change date of service.

## 2018-04-03 ENCOUNTER — NURSE ONLY (OUTPATIENT)
Dept: OBGYN CLINIC | Facility: CLINIC | Age: 28
End: 2018-04-03

## 2018-04-03 VITALS — HEART RATE: 92 BPM | SYSTOLIC BLOOD PRESSURE: 100 MMHG | DIASTOLIC BLOOD PRESSURE: 65 MMHG

## 2018-04-03 DIAGNOSIS — O09.291: Primary | ICD-10-CM

## 2018-04-03 PROCEDURE — 96372 THER/PROPH/DIAG INJ SC/IM: CPT | Performed by: OBSTETRICS & GYNECOLOGY

## 2018-04-03 RX ADMIN — HYDROXYPROGESTERONE CAPROATE 250 MG: 250 INJECTION INTRAMUSCULAR at 10:14:00

## 2018-04-06 ENCOUNTER — ROUTINE PRENATAL (OUTPATIENT)
Dept: OBGYN CLINIC | Facility: CLINIC | Age: 28
End: 2018-04-06

## 2018-04-06 VITALS
WEIGHT: 130.38 LBS | BODY MASS INDEX: 22 KG/M2 | SYSTOLIC BLOOD PRESSURE: 100 MMHG | HEART RATE: 101 BPM | DIASTOLIC BLOOD PRESSURE: 67 MMHG

## 2018-04-06 DIAGNOSIS — Z34.82 ENCOUNTER FOR SUPERVISION OF OTHER NORMAL PREGNANCY IN SECOND TRIMESTER: Primary | ICD-10-CM

## 2018-04-06 PROCEDURE — 81002 URINALYSIS NONAUTO W/O SCOPE: CPT | Performed by: OBSTETRICS & GYNECOLOGY

## 2018-04-06 PROCEDURE — 0502F SUBSEQUENT PRENATAL CARE: CPT | Performed by: OBSTETRICS & GYNECOLOGY

## 2018-04-08 NOTE — PROGRESS NOTES
Pt feeling well. Good fm. No c/o today. s/p mfm u/s and consult prior. Chronic Hep B carrier.   Hx of PTD in past.

## 2018-04-10 ENCOUNTER — NURSE ONLY (OUTPATIENT)
Dept: OBGYN CLINIC | Facility: CLINIC | Age: 28
End: 2018-04-10

## 2018-04-10 VITALS — SYSTOLIC BLOOD PRESSURE: 100 MMHG | DIASTOLIC BLOOD PRESSURE: 54 MMHG

## 2018-04-10 DIAGNOSIS — O09.293 HISTORY OF PRETERM PREMATURE RUPTURE OF MEMBRANES (PROM) IN PREVIOUS PREGNANCY, CURRENTLY PREGNANT IN THIRD TRIMESTER: ICD-10-CM

## 2018-04-10 PROCEDURE — 96372 THER/PROPH/DIAG INJ SC/IM: CPT | Performed by: ADVANCED PRACTICE MIDWIFE

## 2018-04-10 RX ADMIN — HYDROXYPROGESTERONE CAPROATE 250 MG: 250 INJECTION INTRAMUSCULAR at 10:14:00

## 2018-04-17 ENCOUNTER — NURSE ONLY (OUTPATIENT)
Dept: OBGYN CLINIC | Facility: CLINIC | Age: 28
End: 2018-04-17

## 2018-04-17 ENCOUNTER — TELEPHONE (OUTPATIENT)
Dept: OBGYN CLINIC | Facility: CLINIC | Age: 28
End: 2018-04-17

## 2018-04-17 VITALS — SYSTOLIC BLOOD PRESSURE: 110 MMHG | DIASTOLIC BLOOD PRESSURE: 70 MMHG | HEART RATE: 116 BPM

## 2018-04-17 DIAGNOSIS — Z87.51 HISTORY OF PRETERM DELIVERY: Primary | ICD-10-CM

## 2018-04-17 PROCEDURE — 96372 THER/PROPH/DIAG INJ SC/IM: CPT | Performed by: OBSTETRICS & GYNECOLOGY

## 2018-04-17 RX ADMIN — HYDROXYPROGESTERONE CAPROATE 250 MG: 250 INJECTION INTRAMUSCULAR at 10:16:00

## 2018-04-17 NOTE — TELEPHONE ENCOUNTER
P/A for Nathalie started. 3-5 business days for [employer]. Contact [facility] for refill of rx. ELVIRA GREGG Key: [id number]   Status:  Sent to [facility] 275MG/1.1ML auto-injectors    Need help?  Call us at [phone]

## 2018-04-25 ENCOUNTER — NURSE ONLY (OUTPATIENT)
Dept: OBGYN CLINIC | Facility: CLINIC | Age: 28
End: 2018-04-25

## 2018-04-25 VITALS — SYSTOLIC BLOOD PRESSURE: 112 MMHG | DIASTOLIC BLOOD PRESSURE: 62 MMHG

## 2018-04-25 DIAGNOSIS — O09.293 HISTORY OF PRETERM PREMATURE RUPTURE OF MEMBRANES (PROM) IN PREVIOUS PREGNANCY, CURRENTLY PREGNANT IN THIRD TRIMESTER: ICD-10-CM

## 2018-04-25 PROCEDURE — 96372 THER/PROPH/DIAG INJ SC/IM: CPT | Performed by: ADVANCED PRACTICE MIDWIFE

## 2018-04-25 RX ADMIN — HYDROXYPROGESTERONE CAPROATE 250 MG: 250 INJECTION INTRAMUSCULAR at 10:59:00

## 2018-05-02 ENCOUNTER — NURSE ONLY (OUTPATIENT)
Dept: OBGYN CLINIC | Facility: CLINIC | Age: 28
End: 2018-05-02

## 2018-05-02 VITALS — DIASTOLIC BLOOD PRESSURE: 88 MMHG | SYSTOLIC BLOOD PRESSURE: 100 MMHG | HEART RATE: 91 BPM

## 2018-05-02 DIAGNOSIS — Z87.51 HISTORY OF PRETERM LABOR: Primary | ICD-10-CM

## 2018-05-02 PROCEDURE — 96372 THER/PROPH/DIAG INJ SC/IM: CPT | Performed by: ADVANCED PRACTICE MIDWIFE

## 2018-05-02 RX ADMIN — HYDROXYPROGESTERONE CAPROATE 250 MG: 250 INJECTION INTRAMUSCULAR at 10:32:00

## 2018-05-09 ENCOUNTER — ROUTINE PRENATAL (OUTPATIENT)
Dept: OBGYN CLINIC | Facility: CLINIC | Age: 28
End: 2018-05-09

## 2018-05-09 VITALS
SYSTOLIC BLOOD PRESSURE: 104 MMHG | HEART RATE: 83 BPM | WEIGHT: 134 LBS | BODY MASS INDEX: 22 KG/M2 | DIASTOLIC BLOOD PRESSURE: 70 MMHG

## 2018-05-09 DIAGNOSIS — Z34.83 ENCOUNTER FOR SUPERVISION OF OTHER NORMAL PREGNANCY IN THIRD TRIMESTER: Primary | ICD-10-CM

## 2018-05-09 PROCEDURE — 81002 URINALYSIS NONAUTO W/O SCOPE: CPT | Performed by: OBSTETRICS & GYNECOLOGY

## 2018-05-09 PROCEDURE — 96372 THER/PROPH/DIAG INJ SC/IM: CPT | Performed by: OBSTETRICS & GYNECOLOGY

## 2018-05-09 PROCEDURE — 0502F SUBSEQUENT PRENATAL CARE: CPT | Performed by: OBSTETRICS & GYNECOLOGY

## 2018-05-09 RX ADMIN — HYDROXYPROGESTERONE CAPROATE 250 MG: 250 INJECTION INTRAMUSCULAR at 14:15:00

## 2018-05-11 ENCOUNTER — TELEPHONE (OUTPATIENT)
Dept: OBGYN CLINIC | Facility: CLINIC | Age: 28
End: 2018-05-11

## 2018-05-11 NOTE — TELEPHONE ENCOUNTER
Talked to the pharmacist at pt's pharmacy on file. Because pt has medicaid/[employer]CC[employer], her Domenic Fischer now has to come from [facility] order pharmacy. Nathalie was ordered through [facility] by Ralph and should arrive at pt's home in the next few days.  Message wa

## 2018-05-14 NOTE — TELEPHONE ENCOUNTER
rx is being shipped to our address tomorrow, spoke with sandro from [facility]

## 2018-05-16 ENCOUNTER — LAB ENCOUNTER (OUTPATIENT)
Dept: LAB | Facility: HOSPITAL | Age: 28
End: 2018-05-16
Attending: OBSTETRICS & GYNECOLOGY
Payer: MEDICAID

## 2018-05-16 ENCOUNTER — NURSE ONLY (OUTPATIENT)
Dept: OBGYN CLINIC | Facility: CLINIC | Age: 28
End: 2018-05-16

## 2018-05-16 VITALS — SYSTOLIC BLOOD PRESSURE: 100 MMHG | DIASTOLIC BLOOD PRESSURE: 68 MMHG

## 2018-05-16 DIAGNOSIS — Z34.83 ENCOUNTER FOR SUPERVISION OF OTHER NORMAL PREGNANCY IN THIRD TRIMESTER: ICD-10-CM

## 2018-05-16 DIAGNOSIS — Z87.51 HISTORY OF PRETERM DELIVERY: Primary | ICD-10-CM

## 2018-05-16 PROCEDURE — 82950 GLUCOSE TEST: CPT

## 2018-05-16 PROCEDURE — 85025 COMPLETE CBC W/AUTO DIFF WBC: CPT

## 2018-05-16 PROCEDURE — 36415 COLL VENOUS BLD VENIPUNCTURE: CPT

## 2018-05-16 PROCEDURE — 96372 THER/PROPH/DIAG INJ SC/IM: CPT | Performed by: ADVANCED PRACTICE MIDWIFE

## 2018-05-16 RX ADMIN — HYDROXYPROGESTERONE CAPROATE 250 MG: 250 INJECTION INTRAMUSCULAR at 11:38:00

## 2018-05-17 ENCOUNTER — TELEPHONE (OUTPATIENT)
Dept: PEDIATRICS CLINIC | Facility: CLINIC | Age: 28
End: 2018-05-17

## 2018-05-24 ENCOUNTER — ROUTINE PRENATAL (OUTPATIENT)
Dept: OBGYN CLINIC | Facility: CLINIC | Age: 28
End: 2018-05-24

## 2018-05-24 VITALS
DIASTOLIC BLOOD PRESSURE: 73 MMHG | BODY MASS INDEX: 23 KG/M2 | WEIGHT: 139.19 LBS | SYSTOLIC BLOOD PRESSURE: 109 MMHG | HEART RATE: 86 BPM

## 2018-05-24 DIAGNOSIS — O09.293 HISTORY OF PRETERM PREMATURE RUPTURE OF MEMBRANES (PROM) IN PREVIOUS PREGNANCY, CURRENTLY PREGNANT IN THIRD TRIMESTER: ICD-10-CM

## 2018-05-24 DIAGNOSIS — Z34.83 ENCOUNTER FOR SUPERVISION OF OTHER NORMAL PREGNANCY IN THIRD TRIMESTER: Primary | ICD-10-CM

## 2018-05-24 PROCEDURE — 81002 URINALYSIS NONAUTO W/O SCOPE: CPT | Performed by: OBSTETRICS & GYNECOLOGY

## 2018-05-24 PROCEDURE — 0502F SUBSEQUENT PRENATAL CARE: CPT | Performed by: OBSTETRICS & GYNECOLOGY

## 2018-05-24 PROCEDURE — 96372 THER/PROPH/DIAG INJ SC/IM: CPT | Performed by: OBSTETRICS & GYNECOLOGY

## 2018-05-24 RX ADMIN — HYDROXYPROGESTERONE CAPROATE 250 MG: 250 INJECTION INTRAMUSCULAR at 10:46:00

## 2018-05-24 NOTE — PROGRESS NOTES
c/o SOB 3 x since Saturday. In office today had episode also. Denies chest pain. Lungs clear to ascultation. Heart RRR, 92 pulse. No murmur or gallop  Advised to go to ER now. No leg swelling.   R/O PE

## 2018-05-31 ENCOUNTER — NURSE ONLY (OUTPATIENT)
Dept: OBGYN CLINIC | Facility: CLINIC | Age: 28
End: 2018-05-31

## 2018-05-31 VITALS — SYSTOLIC BLOOD PRESSURE: 108 MMHG | DIASTOLIC BLOOD PRESSURE: 68 MMHG | HEART RATE: 82 BPM

## 2018-05-31 DIAGNOSIS — Z87.51 HISTORY OF PRETERM DELIVERY: Primary | ICD-10-CM

## 2018-05-31 PROCEDURE — 96372 THER/PROPH/DIAG INJ SC/IM: CPT | Performed by: OBSTETRICS & GYNECOLOGY

## 2018-05-31 RX ADMIN — HYDROXYPROGESTERONE CAPROATE 250 MG: 250 INJECTION INTRAMUSCULAR at 10:40:00

## 2018-06-05 ENCOUNTER — TELEPHONE (OUTPATIENT)
Dept: OBGYN CLINIC | Facility: CLINIC | Age: 28
End: 2018-06-05

## 2018-06-05 ENCOUNTER — ROUTINE PRENATAL (OUTPATIENT)
Dept: OBGYN CLINIC | Facility: CLINIC | Age: 28
End: 2018-06-05

## 2018-06-05 VITALS
WEIGHT: 140.81 LBS | DIASTOLIC BLOOD PRESSURE: 72 MMHG | BODY MASS INDEX: 23 KG/M2 | HEART RATE: 75 BPM | SYSTOLIC BLOOD PRESSURE: 108 MMHG

## 2018-06-05 DIAGNOSIS — Z34.83 ENCOUNTER FOR SUPERVISION OF OTHER NORMAL PREGNANCY IN THIRD TRIMESTER: Primary | ICD-10-CM

## 2018-06-05 DIAGNOSIS — B18.1 CARRIER OF HEPATITIS B (HCC): ICD-10-CM

## 2018-06-05 PROCEDURE — 96372 THER/PROPH/DIAG INJ SC/IM: CPT | Performed by: ADVANCED PRACTICE MIDWIFE

## 2018-06-05 PROCEDURE — 0502F SUBSEQUENT PRENATAL CARE: CPT | Performed by: ADVANCED PRACTICE MIDWIFE

## 2018-06-05 PROCEDURE — 81002 URINALYSIS NONAUTO W/O SCOPE: CPT | Performed by: ADVANCED PRACTICE MIDWIFE

## 2018-06-05 RX ADMIN — HYDROXYPROGESTERONE CAPROATE 250 MG: 250 INJECTION INTRAMUSCULAR at 01:00:00

## 2018-06-05 NOTE — PROGRESS NOTES
S.  Denies KERN, vision change, URQ pain, swelling. Baby is active its a boy, EDPS ? Is a homemaker  O. See above  A.  31 weeks S=D  H/O PTB on [place]  HBSAG carrier status  Rubella non-immune  P.   Chart reviewed  EDD18, Ges Age30 w 2 d

## 2018-06-06 ENCOUNTER — TELEPHONE (OUTPATIENT)
Dept: OBGYN CLINIC | Facility: CLINIC | Age: 28
End: 2018-06-06

## 2018-06-06 NOTE — TELEPHONE ENCOUNTER
Pt needs javier medication order. Called [place] pharmacy and refill request was placed. They will be contacting pt to confirm shipment to our office.      Called pt and informed her to expect a call from pharmacy to verify information and confirm shipment

## 2018-06-08 NOTE — TELEPHONE ENCOUNTER
Received call form pharmacy to inform they will be shipping to our office on Monday mid morning- afternoon.

## 2018-06-08 NOTE — TELEPHONE ENCOUNTER
Patients health plan denied the [address] A peer to peer can be done by contacting [phone] with reference to required ID [id number]. LVM for pt informing her that her insurance denied the [address].

## 2018-06-10 NOTE — PROGRESS NOTES
Ramy Beverly    Dear Dr. Armin Jaramillo    Thank you for requesting ultrasound evaluation and maternal fetal medicine consultation on your patient Mary Burks.   As you are aware she is a 32year old female  with a Antimony {WAS/NOT:[id number]} low-lying; the inferior placental edge was *** cm from the internal os.    ***I would generally not advise activity restrictions  unless bleeding complications occur.  Since the low-lying placenta has persisted I would recommend a follow-

## 2018-06-11 ENCOUNTER — HOSPITAL ENCOUNTER (OUTPATIENT)
Dept: PERINATAL CARE | Facility: HOSPITAL | Age: 28
Discharge: HOME | End: 2018-06-11
Attending: ADVANCED PRACTICE MIDWIFE
Payer: MEDICAID

## 2018-06-11 VITALS — HEIGHT: 65 IN | WEIGHT: 140 LBS | BODY MASS INDEX: 23.32 KG/M2

## 2018-06-13 ENCOUNTER — NURSE ONLY (OUTPATIENT)
Dept: OBGYN CLINIC | Facility: CLINIC | Age: 28
End: 2018-06-13

## 2018-06-13 VITALS — SYSTOLIC BLOOD PRESSURE: 102 MMHG | DIASTOLIC BLOOD PRESSURE: 70 MMHG

## 2018-06-13 DIAGNOSIS — O09.293 HISTORY OF PRETERM PREMATURE RUPTURE OF MEMBRANES (PROM) IN PREVIOUS PREGNANCY, CURRENTLY PREGNANT IN THIRD TRIMESTER: ICD-10-CM

## 2018-06-13 DIAGNOSIS — Z87.51 HISTORY OF PRETERM DELIVERY: Primary | ICD-10-CM

## 2018-06-13 PROCEDURE — 96372 THER/PROPH/DIAG INJ SC/IM: CPT | Performed by: ADVANCED PRACTICE MIDWIFE

## 2018-06-13 RX ADMIN — HYDROXYPROGESTERONE CAPROATE 250 MG: 250 INJECTION INTRAMUSCULAR at 11:07:00

## 2018-06-15 ENCOUNTER — MED REC SCAN ONLY (OUTPATIENT)
Dept: OBGYN CLINIC | Facility: CLINIC | Age: 28
End: 2018-06-15

## 2018-06-19 ENCOUNTER — APPOINTMENT (OUTPATIENT)
Dept: LAB | Age: 28
End: 2018-06-19
Attending: ADVANCED PRACTICE MIDWIFE
Payer: MEDICAID

## 2018-06-19 DIAGNOSIS — O98.419 HEPATITIS B AFFECTING PREGNANCY: ICD-10-CM

## 2018-06-19 DIAGNOSIS — B19.10 HEPATITIS B AFFECTING PREGNANCY: ICD-10-CM

## 2018-06-19 PROCEDURE — 80076 HEPATIC FUNCTION PANEL: CPT

## 2018-06-19 PROCEDURE — 87517 HEPATITIS B DNA QUANT: CPT

## 2018-06-19 PROCEDURE — 36415 COLL VENOUS BLD VENIPUNCTURE: CPT

## 2018-06-21 ENCOUNTER — NURSE ONLY (OUTPATIENT)
Dept: OBGYN CLINIC | Facility: CLINIC | Age: 28
End: 2018-06-21

## 2018-06-21 ENCOUNTER — ROUTINE PRENATAL (OUTPATIENT)
Dept: OBGYN CLINIC | Facility: CLINIC | Age: 28
End: 2018-06-21

## 2018-06-21 ENCOUNTER — TELEPHONE (OUTPATIENT)
Dept: PEDIATRICS CLINIC | Facility: CLINIC | Age: 28
End: 2018-06-21

## 2018-06-21 VITALS — SYSTOLIC BLOOD PRESSURE: 110 MMHG | DIASTOLIC BLOOD PRESSURE: 72 MMHG

## 2018-06-21 VITALS
DIASTOLIC BLOOD PRESSURE: 71 MMHG | BODY MASS INDEX: 24 KG/M2 | WEIGHT: 143 LBS | SYSTOLIC BLOOD PRESSURE: 108 MMHG | HEART RATE: 58 BPM

## 2018-06-21 DIAGNOSIS — O09.293 HISTORY OF PRETERM PREMATURE RUPTURE OF MEMBRANES (PROM) IN PREVIOUS PREGNANCY, CURRENTLY PREGNANT IN THIRD TRIMESTER: ICD-10-CM

## 2018-06-21 DIAGNOSIS — Z34.93 NORMAL PREGNANCY IN THIRD TRIMESTER: Primary | ICD-10-CM

## 2018-06-21 PROCEDURE — 96372 THER/PROPH/DIAG INJ SC/IM: CPT | Performed by: OBSTETRICS & GYNECOLOGY

## 2018-06-21 PROCEDURE — 81002 URINALYSIS NONAUTO W/O SCOPE: CPT | Performed by: OBSTETRICS & GYNECOLOGY

## 2018-06-21 PROCEDURE — 0502F SUBSEQUENT PRENATAL CARE: CPT | Performed by: OBSTETRICS & GYNECOLOGY

## 2018-06-21 RX ADMIN — HYDROXYPROGESTERONE CAPROATE 250 MG: 250 INJECTION INTRAMUSCULAR at 13:19:00

## 2018-06-21 NOTE — TELEPHONE ENCOUNTER
Pt requesting a Proof of pregnancy note. Please fax to employer [phone], and pt would like to  copy at [facility].

## 2018-06-21 NOTE — PROGRESS NOTES
[facility], [facility]  Obstetrics and Gynecology  Prenatal Visit  Anne Helm MD    JANEE Bernal is a 32year old.o.  33w4d weeks. Here for routine prenatal visit and is without complaints.   Patient denies any regular uterine contractions, sp Maternal-Fetal Medicine ultrasound for placental location and for fetal growth.  labor instructions are given. Patient will continue her weekly IM progesterone until 36 weeks. Patient follow-up for repeat prenatal visit in 2 weeks.   KARI LARRY

## 2018-06-22 NOTE — TELEPHONE ENCOUNTER
Letter generated and faxed to number provided. Receipt of confirmation received. Pt called twice, but someone would  the line and then hang up.

## 2018-06-26 ENCOUNTER — TELEPHONE (OUTPATIENT)
Dept: OBGYN CLINIC | Facility: CLINIC | Age: 28
End: 2018-06-26

## 2018-06-26 NOTE — TELEPHONE ENCOUNTER
[phone] ([facility])   Pt informed of Dr. Alfredo Mahan recommendation. Verbalized understanding. No further question.

## 2018-06-26 NOTE — TELEPHONE ENCOUNTER
----- Message from Bryan Dumas MD sent at 6/25/2018  2:20 PM CDT -----  Results to Dr. Ceci Olmedo for his review.

## 2018-06-26 NOTE — TELEPHONE ENCOUNTER
The patient's level of hepatitis B viremia is very low. Treatment of hepatitis B is not recommended at this time. If the patient is at 33 weeks gestation, I would not retest during pregnancy unless symptomatic.   Hepatitis B DNA and liver chemistries madhu

## 2018-06-27 ENCOUNTER — HOSPITAL ENCOUNTER (OUTPATIENT)
Dept: PERINATAL CARE | Facility: HOSPITAL | Age: 28
Discharge: HOME | End: 2018-06-27
Attending: OBSTETRICS & GYNECOLOGY
Payer: MEDICAID

## 2018-06-27 DIAGNOSIS — B18.1 HEPATITIS B CARRIER (HCC): ICD-10-CM

## 2018-06-27 DIAGNOSIS — O09.293 HISTORY OF PRETERM PREMATURE RUPTURE OF MEMBRANES (PROM) IN PREVIOUS PREGNANCY, CURRENTLY PREGNANT IN THIRD TRIMESTER: Primary | ICD-10-CM

## 2018-06-27 DIAGNOSIS — O09.293 HISTORY OF PRETERM PREMATURE RUPTURE OF MEMBRANES (PROM) IN PREVIOUS PREGNANCY, CURRENTLY PREGNANT IN THIRD TRIMESTER: ICD-10-CM

## 2018-06-27 PROCEDURE — 99213 OFFICE O/P EST LOW 20 MIN: CPT | Performed by: OBSTETRICS & GYNECOLOGY

## 2018-06-27 PROCEDURE — 76816 OB US FOLLOW-UP PER FETUS: CPT | Performed by: OBSTETRICS & GYNECOLOGY

## 2018-06-27 NOTE — PROGRESS NOTES
Pt for Growth US  HX chronic Hep B post ll placenta resolved ptl pprom  Denies pregnancy complaints  States active fetus

## 2018-06-27 NOTE — PROGRESS NOTES
Ani Walsh  Dear Dr. Morelia Salinas,     Thank you for requesting ultrasound evaluation and maternal fetal medicine consultation on your patient Marisol Rubio.   As you are aware she is a 32year old female  with a Single

## 2018-06-28 ENCOUNTER — NURSE ONLY (OUTPATIENT)
Dept: OBGYN CLINIC | Facility: CLINIC | Age: 28
End: 2018-06-28

## 2018-06-28 VITALS — DIASTOLIC BLOOD PRESSURE: 72 MMHG | HEART RATE: 118 BPM | SYSTOLIC BLOOD PRESSURE: 109 MMHG

## 2018-06-28 DIAGNOSIS — O09.293 HISTORY OF PRETERM PREMATURE RUPTURE OF MEMBRANES (PROM) IN PREVIOUS PREGNANCY, CURRENTLY PREGNANT IN THIRD TRIMESTER: ICD-10-CM

## 2018-06-28 PROCEDURE — 96372 THER/PROPH/DIAG INJ SC/IM: CPT | Performed by: OBSTETRICS & GYNECOLOGY

## 2018-06-28 RX ADMIN — HYDROXYPROGESTERONE CAPROATE 250 MG: 250 INJECTION INTRAMUSCULAR at 09:54:00

## 2018-07-06 ENCOUNTER — ROUTINE PRENATAL (OUTPATIENT)
Dept: OBGYN CLINIC | Facility: CLINIC | Age: 28
End: 2018-07-06

## 2018-07-06 VITALS
DIASTOLIC BLOOD PRESSURE: 81 MMHG | WEIGHT: 145.63 LBS | HEART RATE: 83 BPM | BODY MASS INDEX: 24 KG/M2 | SYSTOLIC BLOOD PRESSURE: 121 MMHG

## 2018-07-06 DIAGNOSIS — Z34.83 ENCOUNTER FOR SUPERVISION OF OTHER NORMAL PREGNANCY IN THIRD TRIMESTER: Primary | ICD-10-CM

## 2018-07-06 LAB
MULTISTIX LOT#: NORMAL NUMERIC
PH, URINE: 5.5 (ref 4.5–8)
SPECIFIC GRAVITY: 1 (ref 1–1.03)
URINE-COLOR: YELLOW
UROBILINOGEN,SEMI-QN: 0.2 MG/DL (ref 0–1.9)

## 2018-07-06 PROCEDURE — 0502F SUBSEQUENT PRENATAL CARE: CPT | Performed by: OBSTETRICS & GYNECOLOGY

## 2018-07-06 PROCEDURE — 96372 THER/PROPH/DIAG INJ SC/IM: CPT | Performed by: OBSTETRICS & GYNECOLOGY

## 2018-07-06 PROCEDURE — 81002 URINALYSIS NONAUTO W/O SCOPE: CPT | Performed by: OBSTETRICS & GYNECOLOGY

## 2018-07-06 RX ADMIN — HYDROXYPROGESTERONE CAPROATE 250 MG: 250 INJECTION INTRAMUSCULAR at 15:02:00

## 2018-07-07 NOTE — PROGRESS NOTES
Good fm. Kick cts. Hx of chronic hep b. Being followed by GI. On javier weekly due to hx of 35 week  delivery.

## 2018-07-12 ENCOUNTER — ROUTINE PRENATAL (OUTPATIENT)
Dept: OBGYN CLINIC | Facility: CLINIC | Age: 28
End: 2018-07-12

## 2018-07-12 VITALS
BODY MASS INDEX: 24.55 KG/M2 | WEIGHT: 147.38 LBS | HEIGHT: 65 IN | SYSTOLIC BLOOD PRESSURE: 117 MMHG | HEART RATE: 85 BPM | DIASTOLIC BLOOD PRESSURE: 80 MMHG

## 2018-07-12 DIAGNOSIS — Z34.83 PRENATAL CARE, SUBSEQUENT PREGNANCY, THIRD TRIMESTER: Primary | ICD-10-CM

## 2018-07-12 LAB
APPEARANCE: CLEAR
MULTISTIX LOT#: NORMAL NUMERIC
PH, URINE: 6 (ref 4.5–8)
SPECIFIC GRAVITY: 1.01 (ref 1–1.03)
URINE-COLOR: YELLOW
UROBILINOGEN,SEMI-QN: 0.2 MG/DL (ref 0–1.9)

## 2018-07-12 PROCEDURE — 0502F SUBSEQUENT PRENATAL CARE: CPT | Performed by: OBSTETRICS & GYNECOLOGY

## 2018-07-12 PROCEDURE — 81002 URINALYSIS NONAUTO W/O SCOPE: CPT | Performed by: OBSTETRICS & GYNECOLOGY

## 2018-07-12 RX ORDER — HYDROXYPROGESTERONE CAPROATE 250 MG/ML
INJECTION INTRAMUSCULAR
OUTPATIENT
Start: 2018-07-12

## 2018-07-17 ENCOUNTER — APPOINTMENT (OUTPATIENT)
Dept: LAB | Age: 28
End: 2018-07-17
Attending: OBSTETRICS & GYNECOLOGY
Payer: MEDICAID

## 2018-07-17 DIAGNOSIS — Z34.83 ENCOUNTER FOR SUPERVISION OF OTHER NORMAL PREGNANCY IN THIRD TRIMESTER: ICD-10-CM

## 2018-07-17 PROCEDURE — 87086 URINE CULTURE/COLONY COUNT: CPT

## 2018-07-20 ENCOUNTER — ROUTINE PRENATAL (OUTPATIENT)
Dept: OBGYN CLINIC | Facility: CLINIC | Age: 28
End: 2018-07-20
Payer: MEDICAID

## 2018-07-20 VITALS
WEIGHT: 146.63 LBS | BODY MASS INDEX: 24 KG/M2 | DIASTOLIC BLOOD PRESSURE: 79 MMHG | HEART RATE: 81 BPM | SYSTOLIC BLOOD PRESSURE: 116 MMHG

## 2018-07-20 DIAGNOSIS — Z34.83 ENCOUNTER FOR SUPERVISION OF OTHER NORMAL PREGNANCY IN THIRD TRIMESTER: Primary | ICD-10-CM

## 2018-07-20 LAB
MULTISTIX LOT#: NORMAL NUMERIC
PH, URINE: 6 (ref 4.5–8)
SPECIFIC GRAVITY: 1.01 (ref 1–1.03)
URINE-COLOR: YELLOW
UROBILINOGEN,SEMI-QN: 0.2 MG/DL (ref 0–1.9)

## 2018-07-20 PROCEDURE — 81002 URINALYSIS NONAUTO W/O SCOPE: CPT | Performed by: OBSTETRICS & GYNECOLOGY

## 2018-07-20 PROCEDURE — 0502F SUBSEQUENT PRENATAL CARE: CPT | Performed by: OBSTETRICS & GYNECOLOGY

## 2018-07-20 NOTE — PROGRESS NOTES
[address] [facility]  Obstetrics and Gynecology  Prenatal Visit  Bruce Burgos MD    JANEE Byrd is a 32year old.o.  37w5d weeks. Here for routine prenatal visit and is without complaints.   Patient denies any regular uterine contractions, sp

## 2018-07-26 ENCOUNTER — HOSPITAL ENCOUNTER (OUTPATIENT)
Facility: HOSPITAL | Age: 28
Setting detail: OBSERVATION
Discharge: HOME | End: 2018-07-26
Attending: OBSTETRICS & GYNECOLOGY | Admitting: OBSTETRICS & GYNECOLOGY
Payer: MEDICAID

## 2018-07-26 ENCOUNTER — ROUTINE PRENATAL (OUTPATIENT)
Dept: OBGYN CLINIC | Facility: CLINIC | Age: 28
End: 2018-07-26
Payer: MEDICAID

## 2018-07-26 VITALS
DIASTOLIC BLOOD PRESSURE: 80 MMHG | WEIGHT: 148.38 LBS | BODY MASS INDEX: 25 KG/M2 | HEART RATE: 101 BPM | SYSTOLIC BLOOD PRESSURE: 113 MMHG

## 2018-07-26 DIAGNOSIS — Z34.83 ENCOUNTER FOR SUPERVISION OF OTHER NORMAL PREGNANCY IN THIRD TRIMESTER: Primary | ICD-10-CM

## 2018-07-26 PROBLEM — O36.8130 DECREASED FETAL MOVEMENTS IN THIRD TRIMESTER: Status: ACTIVE | Noted: 2018-07-26

## 2018-07-26 PROBLEM — O36.8190 DECREASED FETAL MOVEMENT: Status: ACTIVE | Noted: 2018-07-26

## 2018-07-26 PROCEDURE — 81002 URINALYSIS NONAUTO W/O SCOPE: CPT | Performed by: OBSTETRICS & GYNECOLOGY

## 2018-07-26 PROCEDURE — 0502F SUBSEQUENT PRENATAL CARE: CPT | Performed by: OBSTETRICS & GYNECOLOGY

## 2018-07-26 PROCEDURE — 59025 FETAL NON-STRESS TEST: CPT | Performed by: OBSTETRICS & GYNECOLOGY

## 2018-07-26 NOTE — NST
Pt.Nonstress Test   Patient: Lay Jewell    Gestation: 38w4d    NST: Reactive       Variability: Moderate           Accelerations: Yes           Decelerations: None            Baseline: 140 BPM           Uterine Irritability: Yes           Contractions:

## 2018-07-26 NOTE — PROGRESS NOTES
No c/o. Decreased fm for a couple of days. Labor/rom prec  To [facility] triage now for nst.  FM kick counting discussed.

## 2018-07-27 ENCOUNTER — HOSPITAL ENCOUNTER (INPATIENT)
Facility: HOSPITAL | Age: 28
LOS: 2 days | Discharge: HOME | End: 2018-07-29
Attending: OBSTETRICS & GYNECOLOGY | Admitting: OBSTETRICS & GYNECOLOGY
Payer: MEDICAID

## 2018-07-27 PROBLEM — Z34.90 PREGNANT: Status: ACTIVE | Noted: 2018-07-27

## 2018-07-27 LAB
ANTIBODY SCREEN: NEGATIVE
BASOPHILS # BLD: 0 K/UL (ref 0–0.2)
BASOPHILS NFR BLD: 0 %
EOSINOPHIL # BLD: 0 K/UL (ref 0–0.7)
EOSINOPHIL NFR BLD: 0 %
ERYTHROCYTE [DISTWIDTH] IN BLOOD BY AUTOMATED COUNT: 14 % (ref 11–15)
ERYTHROCYTE [DISTWIDTH] IN BLOOD BY AUTOMATED COUNT: 14.4 % (ref 11–15)
HCT VFR BLD AUTO: 33.2 % (ref 35–48)
HCT VFR BLD AUTO: 38.1 % (ref 35–48)
HGB BLD-MCNC: 11.1 G/DL (ref 12–16)
HGB BLD-MCNC: 12.3 G/DL (ref 12–16)
HIV1+2 AB SPEC QL IA.RAPID: NONREACTIVE
LYMPHOCYTES # BLD: 1.6 K/UL (ref 1–4)
LYMPHOCYTES NFR BLD: 14 %
MCH RBC QN AUTO: 27.9 PG (ref 27–32)
MCH RBC QN AUTO: 28.4 PG (ref 27–32)
MCHC RBC AUTO-ENTMCNC: 32.2 G/DL (ref 32–37)
MCHC RBC AUTO-ENTMCNC: 33.4 G/DL (ref 32–37)
MCV RBC AUTO: 85.1 FL (ref 80–100)
MCV RBC AUTO: 86.6 FL (ref 80–100)
MONOCYTES # BLD: 0.9 K/UL (ref 0–1)
MONOCYTES NFR BLD: 8 %
NEUTROPHILS # BLD AUTO: 8.7 K/UL (ref 1.8–7.7)
NEUTROPHILS NFR BLD: 78 %
PLATELET # BLD AUTO: 127 K/UL (ref 140–400)
PLATELET # BLD AUTO: 178 K/UL (ref 140–400)
PMV BLD AUTO: 11.4 FL (ref 7.4–10.3)
PMV BLD AUTO: 12 FL (ref 7.4–10.3)
RBC # BLD AUTO: 3.9 M/UL (ref 3.7–5.4)
RBC # BLD AUTO: 4.4 M/UL (ref 3.7–5.4)
RH BLOOD TYPE: POSITIVE
T PALLIDUM AB SER QL: NEGATIVE
WBC # BLD AUTO: 11.3 K/UL (ref 4–11)
WBC # BLD AUTO: 18.2 K/UL (ref 4–11)

## 2018-07-27 PROCEDURE — 86901 BLOOD TYPING SEROLOGIC RH(D): CPT | Performed by: OBSTETRICS & GYNECOLOGY

## 2018-07-27 PROCEDURE — 86701 HIV-1ANTIBODY: CPT | Performed by: OBSTETRICS & GYNECOLOGY

## 2018-07-27 PROCEDURE — 86850 RBC ANTIBODY SCREEN: CPT | Performed by: OBSTETRICS & GYNECOLOGY

## 2018-07-27 PROCEDURE — 85025 COMPLETE CBC W/AUTO DIFF WBC: CPT | Performed by: OBSTETRICS & GYNECOLOGY

## 2018-07-27 PROCEDURE — 86780 TREPONEMA PALLIDUM: CPT | Performed by: OBSTETRICS & GYNECOLOGY

## 2018-07-27 PROCEDURE — 86900 BLOOD TYPING SEROLOGIC ABO: CPT | Performed by: OBSTETRICS & GYNECOLOGY

## 2018-07-27 PROCEDURE — 88307 TISSUE EXAM BY PATHOLOGIST: CPT | Performed by: OBSTETRICS & GYNECOLOGY

## 2018-07-27 PROCEDURE — 36415 COLL VENOUS BLD VENIPUNCTURE: CPT

## 2018-07-27 PROCEDURE — 85027 COMPLETE CBC AUTOMATED: CPT | Performed by: OBSTETRICS & GYNECOLOGY

## 2018-07-27 PROCEDURE — 0KQM0ZZ REPAIR PERINEUM MUSCLE, OPEN APPROACH: ICD-10-PCS | Performed by: OBSTETRICS & GYNECOLOGY

## 2018-07-27 RX ORDER — TRISODIUM CITRATE DIHYDRATE AND CITRIC ACID MONOHYDRATE 500; 334 MG/5ML; MG/5ML
30 SOLUTION ORAL AS NEEDED
Status: DISCONTINUED | OUTPATIENT
Start: 2018-07-27 | End: 2018-07-27

## 2018-07-27 RX ORDER — DOCUSATE SODIUM 100 MG/1
100 CAPSULE, LIQUID FILLED ORAL 2 TIMES DAILY
Status: DISCONTINUED | OUTPATIENT
Start: 2018-07-27 | End: 2018-07-29

## 2018-07-27 RX ORDER — IBUPROFEN 600 MG/1
600 TABLET ORAL ONCE AS NEEDED
Status: DISCONTINUED | OUTPATIENT
Start: 2018-07-27 | End: 2018-07-27 | Stop reason: HOSPADM

## 2018-07-27 RX ORDER — IBUPROFEN 600 MG/1
600 TABLET ORAL EVERY 6 HOURS PRN
Status: DISCONTINUED | OUTPATIENT
Start: 2018-07-27 | End: 2018-07-29

## 2018-07-27 RX ORDER — OXYTOCIN 10 [USP'U]/ML
INJECTION, SOLUTION INTRAMUSCULAR; INTRAVENOUS
Status: DISCONTINUED
Start: 2018-07-27 | End: 2018-07-27 | Stop reason: WASHOUT

## 2018-07-27 RX ORDER — AMMONIA INHALANTS 0.04 G/.3ML
0.3 INHALANT RESPIRATORY (INHALATION) AS NEEDED
Status: DISCONTINUED | OUTPATIENT
Start: 2018-07-27 | End: 2018-07-27

## 2018-07-27 RX ORDER — OXYTOCIN 10 [USP'U]/ML
10 INJECTION, SOLUTION INTRAMUSCULAR; INTRAVENOUS ONCE
Status: DISCONTINUED | OUTPATIENT
Start: 2018-07-27 | End: 2018-07-27

## 2018-07-27 RX ORDER — LIDOCAINE HYDROCHLORIDE 10 MG/ML
30 INJECTION, SOLUTION EPIDURAL; INFILTRATION; INTRACAUDAL; PERINEURAL ONCE
Status: COMPLETED | OUTPATIENT
Start: 2018-07-27 | End: 2018-07-27

## 2018-07-27 RX ORDER — BISACODYL 10 MG
10 SUPPOSITORY, RECTAL RECTAL ONCE AS NEEDED
Status: DISCONTINUED | OUTPATIENT
Start: 2018-07-27 | End: 2018-07-29

## 2018-07-27 RX ORDER — AMMONIA INHALANTS 0.04 G/.3ML
0.3 INHALANT RESPIRATORY (INHALATION) AS NEEDED
Status: DISCONTINUED | OUTPATIENT
Start: 2018-07-27 | End: 2018-07-29

## 2018-07-27 RX ORDER — SIMETHICONE 80 MG
80 TABLET,CHEWABLE ORAL 3 TIMES DAILY PRN
Status: DISCONTINUED | OUTPATIENT
Start: 2018-07-27 | End: 2018-07-29

## 2018-07-27 RX ORDER — ONDANSETRON 2 MG/ML
4 INJECTION INTRAMUSCULAR; INTRAVENOUS EVERY 6 HOURS PRN
Status: DISCONTINUED | OUTPATIENT
Start: 2018-07-27 | End: 2018-07-29

## 2018-07-27 RX ORDER — SODIUM CHLORIDE 0.9 % (FLUSH) 0.9 %
10 SYRINGE (ML) INJECTION AS NEEDED
Status: DISCONTINUED | OUTPATIENT
Start: 2018-07-27 | End: 2018-07-29

## 2018-07-27 RX ORDER — TERBUTALINE SULFATE 1 MG/ML
0.25 INJECTION, SOLUTION SUBCUTANEOUS AS NEEDED
Status: DISCONTINUED | OUTPATIENT
Start: 2018-07-27 | End: 2018-07-27

## 2018-07-27 RX ORDER — LIDOCAINE HYDROCHLORIDE 10 MG/ML
INJECTION, SOLUTION EPIDURAL; INFILTRATION; INTRACAUDAL; PERINEURAL
Status: COMPLETED
Start: 2018-07-27 | End: 2018-07-27

## 2018-07-27 RX ORDER — DIAPER,BRIEF,INFANT-TODD,DISP
1 EACH MISCELLANEOUS EVERY 6 HOURS PRN
Status: DISCONTINUED | OUTPATIENT
Start: 2018-07-27 | End: 2018-07-29

## 2018-07-27 RX ORDER — PRENATAL VIT,CAL 76/IRON/FOLIC 29 MG-1 MG
1 TABLET ORAL DAILY
Status: DISCONTINUED | OUTPATIENT
Start: 2018-07-27 | End: 2018-07-29

## 2018-07-27 NOTE — H&P
[facility]    History & Physical    Mic Garcia Patient Status:  Inpatient    1990 MRN [id number]   Location [address]  Attending Ariadna Hampton MD   Hosp Day # 0 PCP Leni Mccarty MD     Date of Ad Hr        Glucose 2 Hr        Glucose 3 Hr        HgB A1c        Vitamin D              8-20 Weeks     Test Value Reference Range Date Time    1st Trimester Aneuploidy Risk Assessment        Quad - Down Screen Risk Estimate - prior to 02/20/18        [employer] PPROM and  delivery at 35 weeks  2. Rubella nonimmune  3.   History of hepatitis B with third trimester viral load being low  History   Obstetric History:   OB History    Para Term  AB Living   2 1   1   1   SAB TAB Ectopic Multiple George Call exam: Dilation: Complete  Effacement: 100%  Station: +2  Position: Cephalic  Patient quickly progressed to delivery    Results:     Lab Results  Component Value Date   TREPONEMALAB Negative 01/23/2018   HIV Nonreactive  01/23/2018   HBVSAG Reactive  (A) 01

## 2018-07-27 NOTE — L&D DELIVERY NOTE
[facility]P - [facility]    Vaginal Delivery Note    [facility] Patient Status:  Inpatient    1990 MRN [id number]   Location [facility] [facility] Attending Jenifer Houston MD   Hosp Day # 0 PCP Leni Nuñez MD     Deliver

## 2018-07-28 RX ORDER — IBUPROFEN 600 MG/1
600 TABLET ORAL EVERY 6 HOURS PRN
Qty: 30 TABLET | Refills: 0 | Status: SHIPPED | OUTPATIENT
Start: 2018-07-28 | End: 2018-09-10

## 2018-07-28 NOTE — DISCHARGE SUMMARY
[facility]    Discharge Summary    Anu Rik Patient Status:  Inpatient    1990 MRN [id number]   Location [facility] 3SE Attending Sharyle Cabot, MD   Hosp Day # 1 PCP Leni Mccarty MD     Date of Admission:

## 2018-07-28 NOTE — PROGRESS NOTES
[facility]    OB/GYNE Progress Note      Yue Rivas Patient Status:  Inpatient    1990 MRN [id number]   Location [facility] 3SE Attending Jonathan Coronel MD   Hosp Day # 1 PCP Leni Mccarty MD       Assessment/Plan 06/19/2018   AST 27 06/19/2018   ALT 21 06/19/2018   TSH 2.20 05/12/2017         Lab Results  Component Value Date   B12 400 05/12/2017   COLORUR Yellow 01/23/2018   CLARITY Hazy (A) 01/23/2018   SPECGRAVITY 1.020 07/26/2018   PROUR Negative 01/23/2018   G

## 2018-07-28 NOTE — LACTATION NOTE
This note was copied from a baby's chart.   LACTATION NOTE - INFANT    Evaluation Type  Evaluation Type: Inpatient    Problems & Assessment  Muscle tone: Appropriate for GA    Feeding Assessment  Summary Current Feeding: Adlib;Breastfeeding exclusively  Cindy

## 2018-07-29 VITALS
TEMPERATURE: 98 F | DIASTOLIC BLOOD PRESSURE: 63 MMHG | RESPIRATION RATE: 16 BRPM | WEIGHT: 147.69 LBS | BODY MASS INDEX: 24.61 KG/M2 | HEART RATE: 84 BPM | SYSTOLIC BLOOD PRESSURE: 107 MMHG | HEIGHT: 64.96 IN

## 2018-07-29 NOTE — DISCHARGE SUMMARY
[facility]OSP - [facility]    Discharge Summary/Discharge Note    Lidia Salvador Patient Status:  Inpatient    1990 MRN [id number]   Location [facility] 3SE Attending Leeann Almazan MD   Hosp Day # 2 PCP MD Leif Bro Lab Results  Component Value Date   B12 400 05/12/2017   COLORUR Yellow 01/23/2018   CLARITY Hazy (A) 01/23/2018   SPECGRAVITY 1.020 07/26/2018   PROUR Negative 01/23/2018   GLUUR neg 07/26/2018   KETUR Trace (A) 01/23/2018   BILUR Negative 01/23/2 just  Interventions for baby. 2.  History of  delivery. 3.  Rubella nonimmune. Delivered By: Professor Madhavi Santiago MD  Baby:  Marizol Marcelo Sex:    Information for the patient's : Andrew Zabala  [[id number]]     male

## 2018-07-29 NOTE — PROGRESS NOTES
Discharge order received. Discharge folder reviewed. Emphasized preclampsia s/s. Pt verbalized understanding. Current medication reviewed. Id bands matched with infant's band. Patient informed of when to follow up with the OB.      Mother interacting with b

## 2018-07-29 NOTE — PLAN OF CARE
Problem: POSTPARTUM  Goal: Experiences normal breast weaning course  INTERVENTIONS:  - Assess for and manage engorgement. - Instruct on breast care. - Provide comfort measures.    Outcome: Completed Date Met: 07/29/18

## 2018-07-29 NOTE — PLAN OF CARE
Sat with patient to review plan of care. Discussed analgesic options and anticipated discharge. Answered all questions. VSS. Breastfeeding on demand. Breastfeeding successfully. Voiding independently. Lochia is WNL. Uterus is firm.

## 2018-08-06 ENCOUNTER — TELEPHONE (OUTPATIENT)
Dept: OBGYN CLINIC | Facility: CLINIC | Age: 28
End: 2018-08-06

## 2018-08-06 NOTE — TELEPHONE ENCOUNTER
Agree with Triage advice given. These clots are a cast of the uterus that get pushed out as the uterus continues to shrink postpartum. As the uterus shrinks then the clots become smaller. This is a normal process.

## 2018-08-06 NOTE — TELEPHONE ENCOUNTER
on . Pt voices she past a half dollar size clot after voiding this am. Jovon Gault is minimal and pt is changing her sanitary pad every 3-4 hours. Advised pt it is normal to pass an occasional small clot a couple of weeks after delivery.  Advised pt if

## 2018-08-23 ENCOUNTER — OFFICE VISIT (OUTPATIENT)
Dept: FAMILY MEDICINE CLINIC | Facility: CLINIC | Age: 28
End: 2018-08-23
Payer: MEDICAID

## 2018-08-23 VITALS
DIASTOLIC BLOOD PRESSURE: 69 MMHG | HEIGHT: 65 IN | BODY MASS INDEX: 21.33 KG/M2 | HEART RATE: 106 BPM | TEMPERATURE: 99 F | WEIGHT: 128 LBS | SYSTOLIC BLOOD PRESSURE: 100 MMHG

## 2018-08-23 DIAGNOSIS — Z23 NEED FOR MMR VACCINE: ICD-10-CM

## 2018-08-23 DIAGNOSIS — B36.0 TINEA VERSICOLOR: Primary | ICD-10-CM

## 2018-08-23 PROCEDURE — 99212 OFFICE O/P EST SF 10 MIN: CPT | Performed by: FAMILY MEDICINE

## 2018-08-23 PROCEDURE — 99213 OFFICE O/P EST LOW 20 MIN: CPT | Performed by: FAMILY MEDICINE

## 2018-08-23 RX ORDER — KETOCONAZOLE 20 MG/ML
SHAMPOO TOPICAL
Qty: 1 BOTTLE | Refills: 2 | Status: SHIPPED | OUTPATIENT
Start: 2018-08-23 | End: 2019-04-01

## 2018-08-23 NOTE — PROGRESS NOTES
HPI:    Patient ID: Josiah Gan is a 32year old female.     HPI     Patient presents with:  Skin: pt states she has a lot of white spots started on her arms and know spreading all over her body X 6 months   Imm/Inj: pt states she was told she is missing showed that she is not immune. She needs to get this done at the [facility]. No orders of the defined types were placed in this encounter.       Meds This Visit:  Signed Prescriptions Disp Refills    Ketoconazole 2 % External Shampoo 1 Bot

## 2018-09-10 ENCOUNTER — POSTPARTUM (OUTPATIENT)
Dept: OBGYN CLINIC | Facility: CLINIC | Age: 28
End: 2018-09-10
Payer: MEDICAID

## 2018-09-10 VITALS
WEIGHT: 131 LBS | BODY MASS INDEX: 22 KG/M2 | SYSTOLIC BLOOD PRESSURE: 111 MMHG | HEART RATE: 79 BPM | DIASTOLIC BLOOD PRESSURE: 70 MMHG

## 2018-09-10 PROBLEM — Z34.90 PREGNANT: Status: RESOLVED | Noted: 2018-07-27 | Resolved: 2018-09-10

## 2018-09-10 PROBLEM — O36.8130 DECREASED FETAL MOVEMENTS IN THIRD TRIMESTER: Status: RESOLVED | Noted: 2018-07-26 | Resolved: 2018-09-10

## 2018-09-10 PROBLEM — O09.293 HISTORY OF PRETERM PREMATURE RUPTURE OF MEMBRANES (PROM) IN PREVIOUS PREGNANCY, CURRENTLY PREGNANT IN THIRD TRIMESTER: Status: RESOLVED | Noted: 2018-01-31 | Resolved: 2018-09-10

## 2018-09-10 PROCEDURE — 0503F POSTPARTUM CARE VISIT: CPT | Performed by: OBSTETRICS & GYNECOLOGY

## 2018-09-10 NOTE — PROGRESS NOTES
JANEE Smiley is a 32year old female  here for 6 week post-partum visit. Patient delivered a  male infant on 2018 via .     Pt states she is voiding freely, tolerating general diet, having normal bowel movements and currently no lo Delivery: 7/27/2018  3:32 AM   Delivery: spontaneous  Placenta to Pathology: yes  Cord Gases Submitted: no  Cord Blood/Tissue Collection: yes  Cord Complications: none  Sponge and Needle Counts:  Verified     Maternal Anesthesia: none   Episiotomy/Lacerati [id number] here for 6 week post-partum visit  Patient was rubella nonimmune and declined rubella vaccine  Discussed return to fertility and menses. Patient counseled on contraceptive options. Patient has chosen IUD - Paragard.   Patient to return 1 week for

## 2018-09-12 ENCOUNTER — TELEPHONE (OUTPATIENT)
Dept: OBGYN CLINIC | Facility: CLINIC | Age: 28
End: 2018-09-12

## 2018-09-17 ENCOUNTER — APPOINTMENT (OUTPATIENT)
Dept: LAB | Age: 28
End: 2018-09-17
Attending: OBSTETRICS & GYNECOLOGY
Payer: MEDICAID

## 2018-09-17 ENCOUNTER — OFFICE VISIT (OUTPATIENT)
Dept: OBGYN CLINIC | Facility: CLINIC | Age: 28
End: 2018-09-17
Payer: MEDICAID

## 2018-09-17 VITALS
WEIGHT: 131 LBS | HEART RATE: 98 BPM | DIASTOLIC BLOOD PRESSURE: 78 MMHG | SYSTOLIC BLOOD PRESSURE: 113 MMHG | BODY MASS INDEX: 22 KG/M2

## 2018-09-17 DIAGNOSIS — B18.1 VIRAL HEPATITIS B CHRONIC (HCC): ICD-10-CM

## 2018-09-17 DIAGNOSIS — Z30.430 ENCOUNTER FOR IUD INSERTION: Primary | ICD-10-CM

## 2018-09-17 DIAGNOSIS — Z30.430 ENCOUNTER FOR INSERTION OF INTRAUTERINE CONTRACEPTIVE DEVICE: ICD-10-CM

## 2018-09-17 LAB
ALBUMIN SERPL BCP-MCNC: 3.8 G/DL (ref 3.5–4.8)
ALP SERPL-CCNC: 70 U/L (ref 32–100)
ALT SERPL-CCNC: 31 U/L (ref 14–54)
AST SERPL-CCNC: 26 U/L (ref 15–41)
BILIRUB DIRECT SERPL-MCNC: 0 MG/DL (ref 0–0.2)
BILIRUB SERPL-MCNC: 0.3 MG/DL (ref 0.3–1.2)
CONTROL LINE PRESENT WITH A CLEAR BACKGROUND (YES/NO): YES YES/NO
KIT LOT #: NORMAL NUMERIC
PREGNANCY TEST, URINE: NEGATIVE
PROT SERPL-MCNC: 7.1 G/DL (ref 5.9–8.4)

## 2018-09-17 PROCEDURE — 81025 URINE PREGNANCY TEST: CPT | Performed by: OBSTETRICS & GYNECOLOGY

## 2018-09-17 PROCEDURE — 87517 HEPATITIS B DNA QUANT: CPT

## 2018-09-17 PROCEDURE — 36415 COLL VENOUS BLD VENIPUNCTURE: CPT

## 2018-09-17 PROCEDURE — 80076 HEPATIC FUNCTION PANEL: CPT

## 2018-09-17 PROCEDURE — 58300 INSERT INTRAUTERINE DEVICE: CPT | Performed by: OBSTETRICS & GYNECOLOGY

## 2018-09-17 RX ORDER — COPPER 313.4 MG/1
1 INTRAUTERINE DEVICE INTRAUTERINE ONCE
Status: COMPLETED | OUTPATIENT
Start: 2018-09-17 | End: 2018-09-17

## 2018-09-17 RX ADMIN — COPPER 1 DEVICE: 313.4 INTRAUTERINE DEVICE INTRAUTERINE at 16:00:00

## 2018-09-17 NOTE — PROGRESS NOTES
[facility], [facility]  Obstetrics and Gynecology  Focused Gynecology Problem Exam  Clementine Hudson MD    Hunter Zavala is a 32year old female presenting for Procedure (IUD Insertion, UCG)  .     HPI:   Patient presents with:  Procedure: IUD Insertion, UCG    Pa on file      Transportation needs - non-medical: Not on file    Occupational History      Not on file    Tobacco Use      Smoking status: Never Smoker      Smokeless tobacco: Never Used    Substance and Sexual Activity      Alcohol use:  Yes        Alcohol/ IUD check. Opal Valenzuela MD, MD  2:58 PM  9/17/2018      PHYSICAL EXAM:   /76   Pulse 89   Wt 131 lb (59.4 kg)   LMP 10/29/2017 (Exact Date)   Breastfeeding?  No   BMI 21.80 kg/m²     GENERAL: well developed, well nourished, in no apparent dist requested or ordered in this encounter     IMAGING/ REFERRALS:    None     Opal Valenzuela MD  9/17/2018  2:57 PM

## 2018-09-17 NOTE — PROCEDURES
[facility], [facility]  Obstetrics and Gynecology  IUD Insertion Procedure Note  Joan Gale MD    IUD Insertion:     Pregnancy Results: negative from urine test   Birth control method(s) used: Patient is postpartum and has not been sexually active  Pt's LMP

## 2018-09-24 LAB
HBV DNA SERPL NAA+PROBE-ACNC: 357 IU/ML
HBV DNA SERPL NAA+PROBE-LOG IU: 2.55 LOG (IU/ML)
HBV DNA SERPL QL NAA+PROBE: DETECTED

## 2019-04-01 ENCOUNTER — NURSE TRIAGE (OUTPATIENT)
Dept: FAMILY MEDICINE CLINIC | Facility: CLINIC | Age: 29
End: 2019-04-01

## 2019-04-01 ENCOUNTER — OFFICE VISIT (OUTPATIENT)
Dept: FAMILY MEDICINE CLINIC | Facility: CLINIC | Age: 29
End: 2019-04-01
Payer: MEDICAID

## 2019-04-01 VITALS
SYSTOLIC BLOOD PRESSURE: 100 MMHG | HEIGHT: 65 IN | OXYGEN SATURATION: 99 % | BODY MASS INDEX: 20.18 KG/M2 | DIASTOLIC BLOOD PRESSURE: 66 MMHG | WEIGHT: 121.13 LBS | HEART RATE: 91 BPM | TEMPERATURE: 97 F

## 2019-04-01 DIAGNOSIS — R53.83 FATIGUE, UNSPECIFIED TYPE: ICD-10-CM

## 2019-04-01 DIAGNOSIS — R42 VERTIGO: Primary | ICD-10-CM

## 2019-04-01 PROCEDURE — 99214 OFFICE O/P EST MOD 30 MIN: CPT | Performed by: FAMILY MEDICINE

## 2019-04-01 PROCEDURE — 36416 COLLJ CAPILLARY BLOOD SPEC: CPT | Performed by: FAMILY MEDICINE

## 2019-04-01 PROCEDURE — 82962 GLUCOSE BLOOD TEST: CPT | Performed by: FAMILY MEDICINE

## 2019-04-01 PROCEDURE — 85018 HEMOGLOBIN: CPT | Performed by: FAMILY MEDICINE

## 2019-04-01 PROCEDURE — 99212 OFFICE O/P EST SF 10 MIN: CPT | Performed by: FAMILY MEDICINE

## 2019-04-01 NOTE — PROGRESS NOTES
HPI:    Patient ID: Erin Rutherford is a 25year old female.     HPI  Patient presents with:  Blood Pressure: Patient reports \"low blood pressure averging 90/ 60; Morning - 80's/40's Afternoon 100s/60s   Dizziness: Dizziness with Nausea, No Vomiting Off/On

## 2019-04-01 NOTE — TELEPHONE ENCOUNTER
Action Requested: Summary for Provider     []  Critical Lab, Recommendations Needed  [x] Need Additional Advice  [x]   FYI    []   Need Orders  [] Need Medications Sent to Pharmacy  []  Other     SUMMARY: Appt scheduled today 3:30pm with Dr Michell Mendez, for concer

## 2019-10-30 ENCOUNTER — TELEPHONE (OUTPATIENT)
Dept: FAMILY MEDICINE CLINIC | Facility: CLINIC | Age: 29
End: 2019-10-30

## 2019-10-31 ENCOUNTER — OFFICE VISIT (OUTPATIENT)
Dept: FAMILY MEDICINE CLINIC | Facility: CLINIC | Age: 29
End: 2019-10-31
Payer: MEDICAID

## 2019-10-31 ENCOUNTER — LAB ENCOUNTER (OUTPATIENT)
Dept: LAB | Age: 29
End: 2019-10-31
Attending: FAMILY MEDICINE
Payer: MEDICAID

## 2019-10-31 VITALS
SYSTOLIC BLOOD PRESSURE: 103 MMHG | WEIGHT: 117 LBS | BODY MASS INDEX: 19.49 KG/M2 | HEART RATE: 102 BPM | RESPIRATION RATE: 18 BRPM | TEMPERATURE: 98 F | DIASTOLIC BLOOD PRESSURE: 67 MMHG | HEIGHT: 65 IN

## 2019-10-31 DIAGNOSIS — B18.1 VIRAL HEPATITIS B CHRONIC (HCC): ICD-10-CM

## 2019-10-31 DIAGNOSIS — G89.29 CHRONIC PAIN OF RIGHT KNEE: ICD-10-CM

## 2019-10-31 DIAGNOSIS — Z00.00 WELL ADULT EXAM: ICD-10-CM

## 2019-10-31 DIAGNOSIS — Z00.00 WELL ADULT EXAM: Primary | ICD-10-CM

## 2019-10-31 DIAGNOSIS — Z28.21 VACCINATION REFUSED BY PATIENT: ICD-10-CM

## 2019-10-31 DIAGNOSIS — M25.561 CHRONIC PAIN OF RIGHT KNEE: ICD-10-CM

## 2019-10-31 DIAGNOSIS — L70.0 ACNE VULGARIS: ICD-10-CM

## 2019-10-31 PROCEDURE — 85025 COMPLETE CBC W/AUTO DIFF WBC: CPT

## 2019-10-31 PROCEDURE — 80061 LIPID PANEL: CPT

## 2019-10-31 PROCEDURE — 82607 VITAMIN B-12: CPT

## 2019-10-31 PROCEDURE — 80076 HEPATIC FUNCTION PANEL: CPT

## 2019-10-31 PROCEDURE — 36415 COLL VENOUS BLD VENIPUNCTURE: CPT

## 2019-10-31 PROCEDURE — 80048 BASIC METABOLIC PNL TOTAL CA: CPT

## 2019-10-31 PROCEDURE — 82306 VITAMIN D 25 HYDROXY: CPT

## 2019-10-31 PROCEDURE — 87517 HEPATITIS B DNA QUANT: CPT

## 2019-10-31 PROCEDURE — 84443 ASSAY THYROID STIM HORMONE: CPT

## 2019-10-31 PROCEDURE — 99395 PREV VISIT EST AGE 18-39: CPT | Performed by: FAMILY MEDICINE

## 2019-10-31 NOTE — PROGRESS NOTES
HPI:    Patient ID: Jerardo Downey is a 30year old female.     HPI  Patient presents with:  Physical: Patienr present for physical - need right knee surgery      Patient here for physical.  Has 2 kids, 11 and 3 yr old  Has     Non smoker  Gyne- DR The [place] 5' 5\" (1.651 m)   Wt 117 lb (53.1 kg)   LMP 10/17/2019 (Exact Date)   BMI 19.47 kg/m²     Past Medical History:   Diagnosis Date   • Hepatitis B    • Hepatitis B virus infection    •  labor    • Tendon rupture, nontraumatic, quadriceps 2017 Caffeine Concern: Yes          coffee, 2 cups/day        Occupational Exposure: Not Asked        Hobby Hazards: Not Asked        Sleep Concern: Not Asked        Stress Concern: Not Asked        Weight Concern: Not Asked        Special Diet: Not Asked has no cervical adenopathy. Neurological: She is alert and oriented to person, place, and time. She has normal reflexes. No cranial nerve deficit. She exhibits normal muscle tone. Coordination normal.   Skin: Skin is dry. No rash noted. Psychiatric:  Sh

## 2019-11-12 DIAGNOSIS — E83.51 HYPOCALCEMIA: Primary | ICD-10-CM

## 2020-05-20 ENCOUNTER — TELEPHONE (OUTPATIENT)
Dept: GASTROENTEROLOGY | Facility: CLINIC | Age: 30
End: 2020-05-20

## 2020-05-20 DIAGNOSIS — B18.1 CHRONIC HEPATITIS B (HCC): Primary | ICD-10-CM

## 2020-05-20 NOTE — TELEPHONE ENCOUNTER
Lab orders entered. The patient should also have a follow-up office visit or a virtual/telephone visit at her convenience.

## 2020-05-20 NOTE — TELEPHONE ENCOUNTER
Dr. Carrol Meier    Patient asking for orders for labs. Reports that she is to have liver/hepatitis tests every 6 months.       Please advise    Thank you

## 2020-05-20 NOTE — TELEPHONE ENCOUNTER
Patient accepted an appointment for June 3rd and given detailed directions to [facility]. Advised to arrive 15 minutes before appointment time. Aware that lab tests ordered.

## 2020-05-22 ENCOUNTER — APPOINTMENT (OUTPATIENT)
Dept: LAB | Age: 30
End: 2020-05-22
Attending: INTERNAL MEDICINE

## 2020-05-22 DIAGNOSIS — B18.1 CHRONIC HEPATITIS B (HCC): ICD-10-CM

## 2020-05-22 PROCEDURE — 87517 HEPATITIS B DNA QUANT: CPT

## 2020-05-22 PROCEDURE — 36415 COLL VENOUS BLD VENIPUNCTURE: CPT

## 2020-05-22 PROCEDURE — 80053 COMPREHEN METABOLIC PANEL: CPT

## 2020-06-03 ENCOUNTER — OFFICE VISIT (OUTPATIENT)
Dept: GASTROENTEROLOGY | Facility: CLINIC | Age: 30
End: 2020-06-03

## 2020-06-03 VITALS
HEIGHT: 65 IN | SYSTOLIC BLOOD PRESSURE: 113 MMHG | BODY MASS INDEX: 19.06 KG/M2 | DIASTOLIC BLOOD PRESSURE: 77 MMHG | WEIGHT: 114.38 LBS | HEART RATE: 93 BPM

## 2020-06-03 DIAGNOSIS — B18.1 CHRONIC HEPATITIS B (HCC): Primary | ICD-10-CM

## 2020-06-03 PROCEDURE — 99213 OFFICE O/P EST LOW 20 MIN: CPT | Performed by: INTERNAL MEDICINE

## 2020-06-05 NOTE — PATIENT INSTRUCTIONS
1.  Repeat blood test in 3 months. 2.  Please let me know if you wish to proceed with an ultrasound. 3.  Please contact me with any changes.

## 2020-06-05 NOTE — PROGRESS NOTES
HPI:    Patient ID: Clementina Byrd is a 30year old female. HPI  Amos Guzman returns in follow-up. She was last seen in July 2017.     As per previous notes during routine prenatal testing the patient was found to have chronic E antigen negative, E antibody po normal. No scleral icterus. Neck: Neck supple. No thyromegaly present. Cardiovascular: Normal rate, regular rhythm and normal heart sounds. Pulmonary/Chest: Effort normal and breath sounds normal. No respiratory distress. She has no wheezes.  She has Detected (A) Detected (A)   HBV Qnt by NAAT (IU/mL)       4,322     HBV Qnt by NAAT (log IU/mL)      log IU/mL 3.64     HBV Qnt by NAAT Interp      Not Detected Detected (A)       Component      Latest Ref Rng & Units 6/19/2018   Glucose      70 - 99 mg/dL possible that her liver disease will continue to be followed closely and treatment and/or biopsy will be advised at the appropriate time. Likewise there is no contraindication to pregnancy if the patient desires to do so.   Viral load will be followed in t

## 2020-06-08 ENCOUNTER — OFFICE VISIT (OUTPATIENT)
Dept: OBGYN CLINIC | Facility: CLINIC | Age: 30
End: 2020-06-08

## 2020-06-08 VITALS — WEIGHT: 111 LBS | BODY MASS INDEX: 18 KG/M2 | SYSTOLIC BLOOD PRESSURE: 100 MMHG | DIASTOLIC BLOOD PRESSURE: 62 MMHG

## 2020-06-08 DIAGNOSIS — Z30.432 ENCOUNTER FOR IUD REMOVAL: Primary | ICD-10-CM

## 2020-06-08 PROCEDURE — 58301 REMOVE INTRAUTERINE DEVICE: CPT | Performed by: OBSTETRICS & GYNECOLOGY

## 2020-06-08 NOTE — PROCEDURES
[facility], [facility]  Obstetrics and Gynecology  IUD Removal Procedure Note  Valerio Humphrey MD    IUD Removal:        Pt counseled on removal ofParagard IUD.   Discussed return to fertility and need for contraception if patient does not desire pregnancy at this

## 2020-09-07 NOTE — PROGRESS NOTES
LMP 10/29/2017 (Exact Date)     She presented for cervical length measurement due to vaginal pressure.      OB ULTRASOUND REPORT   See imaging tab for complete ultrasound report or in PACS    Fetal Heart Rate: Present 151 bpm  Fetal Presentation: Vertex  Am
Pt for CL  Hx PTD  Pt office today for javier pt c/o pelvic pressure  cx 3.20 no funneling 3/22/18
negative...

## 2025-01-01 NOTE — TELEPHONE ENCOUNTER
Patient notified labs already in the computer no further questions at this time
Pt was just in for nurse education and states she was suppose to have an order for lab work. Pt is at the lab now and no order.  Please advise
objectively assess swallow skills

## (undated) NOTE — MR AVS SNAPSHOT
[address]  [address] [place] [phone]               Thank you for choosing us for your health care visit with Jada Thomas PT.   We are glad to serve you and happy to provide you with this summary o Please arrive at your scheduled appointment time. Wear comfortable, loose fitting clothing.             May 18, 2017  1:45 PM   [place] [facility] Visit By Therapist with [facility] in [address] [place] [address]

## (undated) NOTE — LETTER
AUTHORIZATION FOR SURGICAL OPERATION OR OTHER PROCEDURE    1.  I hereby authorize Dr. Nubia Hurley, and [facility] staff assigned to my case to perform the following operation and/or procedure at the [facility], [facility]:    _________________________ Time:  ________ A. M.  P.M.        Patient Name:  ______________________________________________________  (please print)      Patient signature:  ___________________________________________________             Relationship to Patient:

## (undated) NOTE — MR AVS SNAPSHOT
[address]  [address] [place] [phone]               Thank you for choosing us for your health care visit with [facility], PT.   We are glad to serve you and happy to provide you with this summary o [phone]              "[employer]"     Call the [employer] for assistance with your inactive "[employer] account    If you have questions, you can call [phone] to talk to our [facility] Staff. Remember, "[employer]" is NOT to be used for urgent needs.   For

## (undated) NOTE — MR AVS SNAPSHOT
[address]  [address] [facility] [phone]               Thank you for choosing us for your health care visit with Theresa Pritchard PTA.   We are glad to serve you and happy to provide you with this summ [phone]           Please arrive at your scheduled appointment time. Wear comfortable, loose fitting clothing.             May 25, 2017  3:15 PM   [place] [facility] Visit By Therapist with Angelic Zarate [facility]

## (undated) NOTE — MR AVS SNAPSHOT
[place]  [address]  [phone]               Thank you for choosing us for your health care visit with Tristan Enriquez MD.  We are glad to serve you and happy to provide you with this diaz insurance company's guidelines for prior authorization for this test.  You may be held responsible for payment in full if proper authorization is not acquired. Please contact the Patient Business Office at [phone] if you have any questions related to i

## (undated) NOTE — Clinical Note
5/18/2017              Mic Garcia        [address]         Dear Deloris Man,      It was a pleasure to see you at our [place] , [address], [address] [facility] office.   Your lab tests were normal.  There is no need for fu

## (undated) NOTE — MR AVS SNAPSHOT
[address]  [address] [facility] [phone]               Thank you for choosing us for your health care visit with Lincoln Diego PTA.   We are glad to serve you and happy to provide you with this summ [address] [place] [phone]           Please arrive at your scheduled appointment time. Wear comfortable, loose fitting clothing.             Jul 19, 2017  3:00 PM   Exam - Established with Iwona Nieves MD   [facility], [facility],

## (undated) NOTE — MR AVS SNAPSHOT
[address]  [address] [facility]  [phone]               Thank you for choosing us for your health care visit with Arlet Bates MD.  We are glad to serve you and happy to provide you with this summa [place] [facility] Visit By Therapist with [facility] in [address] [place] [address] ([address] [facility])    [address] [facility] [phone]           Please arrive at your scheduled appointment time.   We authorization, such as [place], please feel free to schedule your appointment immediately. However, if you are unsure about the requirements for authorization, please wait 5-7 days and then contact your physician's office.  At that time, you will able to progress well in therapy and return to PLOF  4.  Pt will ambulate with symmetrical gait 100% of the time and manage stairs with reciprocal pattern with no pain higher t noriega 5/10          Tal     Call the helpdesk for assistance with your inactiv

## (undated) NOTE — MR AVS SNAPSHOT
[address]  [address] [facility] [phone]               Thank you for choosing us for your health care visit with Marii Fish PTA.   We are glad to serve you and happy to provide you with this summ Visit [email or web address] to learn more.

## (undated) NOTE — MR AVS SNAPSHOT
[place]  [address]  [phone]               Thank you for choosing us for your health care visit with Yesenia Patrick MD.  We are glad to serve you and happy to provide you with this diaz to have these services done at another facility or to obtain an approved referral. Services ordered by your physician may not be covered unless prior authorization is obtained in accordance with your insurance company's guidelines.  Unauthorized care may be [email or web address]tn

## (undated) NOTE — MR AVS SNAPSHOT
[address]  [address] [place] [phone]               Thank you for choosing us for your health care visit with Jenna James PT.   We are glad to serve you and happy to provide you with this summary o

## (undated) NOTE — Clinical Note
Repeat HBV viral load at ~ 32 weeks Third trimester ultrasound and assessment of placenta location She would like to begin the [facility], I advised her to call her OB office to get that started

## (undated) NOTE — MR AVS SNAPSHOT
[address]  [address] [facility] [phone]               Thank you for choosing us for your health care visit with Libby Tamez PTA.   We are glad to serve you and happy to provide you with this summ [address] [place] [phone]           Please arrive at your scheduled appointment time. Wear comfortable, loose fitting clothing.             May 25, 2017  3:15 PM   [place] [facility] Visit By Therapist farida Rosado

## (undated) NOTE — Clinical Note
1. She is a candidate for 17 progesterone 2. detailed US at 20wks / growth US in the 3rd trimester 3. liver function testing q 3 months  4. notify nursery of status at delivery  5. sexual partners should be evaluated for hepatitis and the vaccine.   6. cons